# Patient Record
Sex: MALE | Race: WHITE | NOT HISPANIC OR LATINO | Employment: OTHER | URBAN - METROPOLITAN AREA
[De-identification: names, ages, dates, MRNs, and addresses within clinical notes are randomized per-mention and may not be internally consistent; named-entity substitution may affect disease eponyms.]

---

## 2018-11-06 ENCOUNTER — TELEPHONE (OUTPATIENT)
Dept: RADIOLOGY | Facility: HOSPITAL | Age: 68
End: 2018-11-06

## 2018-11-06 ENCOUNTER — HOSPITAL ENCOUNTER (OUTPATIENT)
Dept: RADIOLOGY | Facility: HOSPITAL | Age: 68
Discharge: HOME | End: 2018-11-06
Attending: INTERNAL MEDICINE
Payer: COMMERCIAL

## 2018-11-06 DIAGNOSIS — I10 ESSENTIAL HYPERTENSION, MALIGNANT: ICD-10-CM

## 2018-11-06 DIAGNOSIS — I10 ESSENTIAL HYPERTENSION, MALIGNANT: Primary | ICD-10-CM

## 2018-11-07 ENCOUNTER — HOSPITAL ENCOUNTER (OUTPATIENT)
Dept: RADIOLOGY | Facility: HOSPITAL | Age: 68
Discharge: HOME | End: 2018-11-07
Attending: INTERNAL MEDICINE
Payer: COMMERCIAL

## 2018-11-07 PROCEDURE — 93975 VASCULAR STUDY: CPT | Mod: 26,,, | Performed by: RADIOLOGY

## 2018-11-07 PROCEDURE — 93975 VASCULAR STUDY: CPT | Mod: TC,PO

## 2022-11-26 ENCOUNTER — HOSPITAL ENCOUNTER (INPATIENT)
Facility: HOSPITAL | Age: 72
LOS: 2 days | Discharge: NURSING, REHAB OR LONG-TERM CARE | DRG: 683 | End: 2022-11-29
Attending: EMERGENCY MEDICINE | Admitting: FAMILY MEDICINE
Payer: COMMERCIAL

## 2022-11-26 DIAGNOSIS — N19 UREMIA: Primary | ICD-10-CM

## 2022-11-26 DIAGNOSIS — E87.6 HYPOKALEMIA: ICD-10-CM

## 2022-11-26 DIAGNOSIS — N17.9 AKI (ACUTE KIDNEY INJURY): ICD-10-CM

## 2022-11-26 DIAGNOSIS — R53.1 GENERALIZED WEAKNESS: ICD-10-CM

## 2022-11-26 DIAGNOSIS — N39.0 ACUTE UTI (URINARY TRACT INFECTION): ICD-10-CM

## 2022-11-26 PROBLEM — G80.8 OTHER CEREBRAL PALSY: Status: ACTIVE | Noted: 2022-11-26

## 2022-11-26 PROBLEM — B37.89 CANDIDA RASH OF GROIN: Status: ACTIVE | Noted: 2022-11-26

## 2022-11-26 PROBLEM — R29.898 WEAKNESS OF BOTH LOWER EXTREMITIES: Status: ACTIVE | Noted: 2022-11-26

## 2022-11-26 PROBLEM — B00.50 HERPES SIMPLEX INFECTION OF EYE: Status: ACTIVE | Noted: 2022-11-26

## 2022-11-26 PROBLEM — I10 ESSENTIAL HYPERTENSION: Status: ACTIVE | Noted: 2022-11-26

## 2022-11-26 PROBLEM — K76.6 PORTAL HYPERTENSION: Status: ACTIVE | Noted: 2022-11-26

## 2022-11-26 PROBLEM — E78.2 MIXED HYPERLIPIDEMIA: Status: ACTIVE | Noted: 2022-11-26

## 2022-11-26 PROBLEM — N30.00 ACUTE CYSTITIS WITHOUT HEMATURIA: Status: ACTIVE | Noted: 2022-11-26

## 2022-11-26 LAB
ALBUMIN SERPL BCP-MCNC: 3.1 G/DL (ref 3.5–5.2)
ALP SERPL-CCNC: 77 U/L (ref 55–135)
ALT SERPL W/O P-5'-P-CCNC: 144 U/L (ref 10–44)
ANION GAP SERPL CALC-SCNC: 15 MMOL/L (ref 8–16)
AST SERPL-CCNC: 171 U/L (ref 10–40)
BACTERIA #/AREA URNS AUTO: ABNORMAL /HPF
BASOPHILS # BLD AUTO: 0.07 K/UL (ref 0–0.2)
BASOPHILS NFR BLD: 0.4 % (ref 0–1.9)
BILIRUB SERPL-MCNC: 0.9 MG/DL (ref 0.1–1)
BILIRUB UR QL STRIP: NEGATIVE
BUN SERPL-MCNC: 122 MG/DL (ref 8–23)
CALCIUM SERPL-MCNC: 8.4 MG/DL (ref 8.7–10.5)
CHLORIDE SERPL-SCNC: 104 MMOL/L (ref 95–110)
CLARITY UR REFRACT.AUTO: ABNORMAL
CO2 SERPL-SCNC: 23 MMOL/L (ref 23–29)
COLOR UR AUTO: YELLOW
CREAT SERPL-MCNC: 2.8 MG/DL (ref 0.5–1.4)
CTP QC/QA: YES
DIFFERENTIAL METHOD: ABNORMAL
EOSINOPHIL # BLD AUTO: 0 K/UL (ref 0–0.5)
EOSINOPHIL NFR BLD: 0.2 % (ref 0–8)
ERYTHROCYTE [DISTWIDTH] IN BLOOD BY AUTOMATED COUNT: 15.3 % (ref 11.5–14.5)
EST. GFR  (NO RACE VARIABLE): 23.7 ML/MIN/1.73 M^2
GLUCOSE SERPL-MCNC: 111 MG/DL (ref 70–110)
GLUCOSE UR QL STRIP: NEGATIVE
HCT VFR BLD AUTO: 36.2 % (ref 40–54)
HGB BLD-MCNC: 12.4 G/DL (ref 14–18)
HGB UR QL STRIP: ABNORMAL
HYALINE CASTS UR QL AUTO: 0 /LPF
IMM GRANULOCYTES # BLD AUTO: 0.25 K/UL (ref 0–0.04)
IMM GRANULOCYTES NFR BLD AUTO: 1.5 % (ref 0–0.5)
KETONES UR QL STRIP: NEGATIVE
LEUKOCYTE ESTERASE UR QL STRIP: ABNORMAL
LYMPHOCYTES # BLD AUTO: 2.9 K/UL (ref 1–4.8)
LYMPHOCYTES NFR BLD: 18.1 % (ref 18–48)
MCH RBC QN AUTO: 33.8 PG (ref 27–31)
MCHC RBC AUTO-ENTMCNC: 34.3 G/DL (ref 32–36)
MCV RBC AUTO: 99 FL (ref 82–98)
MICROSCOPIC COMMENT: ABNORMAL
MONOCYTES # BLD AUTO: 1.2 K/UL (ref 0.3–1)
MONOCYTES NFR BLD: 7.1 % (ref 4–15)
NEUTROPHILS # BLD AUTO: 11.7 K/UL (ref 1.8–7.7)
NEUTROPHILS NFR BLD: 72.7 % (ref 38–73)
NITRITE UR QL STRIP: NEGATIVE
NRBC BLD-RTO: 0 /100 WBC
PH UR STRIP: 8 [PH] (ref 5–8)
PLATELET # BLD AUTO: 228 K/UL (ref 150–450)
PMV BLD AUTO: 11.5 FL (ref 9.2–12.9)
POC MOLECULAR INFLUENZA A AGN: NEGATIVE
POC MOLECULAR INFLUENZA B AGN: NEGATIVE
POTASSIUM SERPL-SCNC: 3.4 MMOL/L (ref 3.5–5.1)
PROT SERPL-MCNC: 7 G/DL (ref 6–8.4)
PROT UR QL STRIP: ABNORMAL
RBC # BLD AUTO: 3.67 M/UL (ref 4.6–6.2)
RBC #/AREA URNS AUTO: 15 /HPF (ref 0–4)
SODIUM SERPL-SCNC: 142 MMOL/L (ref 136–145)
SP GR UR STRIP: 1.01 (ref 1–1.03)
URN SPEC COLLECT METH UR: ABNORMAL
UROBILINOGEN UR STRIP-ACNC: NEGATIVE EU/DL
WBC # BLD AUTO: 16.16 K/UL (ref 3.9–12.7)
WBC #/AREA URNS AUTO: 15 /HPF (ref 0–5)
WBC CLUMPS UR QL AUTO: ABNORMAL

## 2022-11-26 PROCEDURE — 63600175 PHARM REV CODE 636 W HCPCS: Mod: ER | Performed by: EMERGENCY MEDICINE

## 2022-11-26 PROCEDURE — 80053 COMPREHEN METABOLIC PANEL: CPT | Mod: ER | Performed by: EMERGENCY MEDICINE

## 2022-11-26 PROCEDURE — G0378 HOSPITAL OBSERVATION PER HR: HCPCS

## 2022-11-26 PROCEDURE — 63600175 PHARM REV CODE 636 W HCPCS: Performed by: NURSE PRACTITIONER

## 2022-11-26 PROCEDURE — 96372 THER/PROPH/DIAG INJ SC/IM: CPT | Performed by: NURSE PRACTITIONER

## 2022-11-26 PROCEDURE — 87086 URINE CULTURE/COLONY COUNT: CPT | Performed by: EMERGENCY MEDICINE

## 2022-11-26 PROCEDURE — 99291 CRITICAL CARE FIRST HOUR: CPT | Mod: 25,ER

## 2022-11-26 PROCEDURE — 86803 HEPATITIS C AB TEST: CPT | Performed by: EMERGENCY MEDICINE

## 2022-11-26 PROCEDURE — 96361 HYDRATE IV INFUSION ADD-ON: CPT | Mod: ER

## 2022-11-26 PROCEDURE — 87389 HIV-1 AG W/HIV-1&-2 AB AG IA: CPT | Performed by: EMERGENCY MEDICINE

## 2022-11-26 PROCEDURE — 25000003 PHARM REV CODE 250: Performed by: NURSE PRACTITIONER

## 2022-11-26 PROCEDURE — 85025 COMPLETE CBC W/AUTO DIFF WBC: CPT | Mod: ER | Performed by: EMERGENCY MEDICINE

## 2022-11-26 PROCEDURE — 87502 INFLUENZA DNA AMP PROBE: CPT | Mod: ER

## 2022-11-26 PROCEDURE — 96361 HYDRATE IV INFUSION ADD-ON: CPT

## 2022-11-26 PROCEDURE — 63700000 PHARM REV CODE 250 ALT 637 W/O HCPCS: Performed by: NURSE PRACTITIONER

## 2022-11-26 PROCEDURE — 81000 URINALYSIS NONAUTO W/SCOPE: CPT | Mod: ER | Performed by: EMERGENCY MEDICINE

## 2022-11-26 PROCEDURE — 96365 THER/PROPH/DIAG IV INF INIT: CPT | Mod: ER

## 2022-11-26 RX ORDER — ASPIRIN 81 MG/1
81 TABLET ORAL
COMMUNITY

## 2022-11-26 RX ORDER — TRIFLURIDINE 1 G/100ML
1 SOLUTION OPHTHALMIC 4 TIMES DAILY
COMMUNITY
Start: 2022-11-18

## 2022-11-26 RX ORDER — BENAZEPRIL HYDROCHLORIDE 40 MG/1
1.5 TABLET ORAL DAILY
COMMUNITY
Start: 2022-10-10

## 2022-11-26 RX ORDER — NEBIVOLOL 20 MG/1
1 TABLET ORAL DAILY
COMMUNITY
Start: 2022-09-22

## 2022-11-26 RX ORDER — VALACYCLOVIR HYDROCHLORIDE 500 MG/1
500 TABLET, FILM COATED ORAL 2 TIMES DAILY
COMMUNITY
Start: 2022-05-26

## 2022-11-26 RX ORDER — FENTANYL CITRATE 50 UG/ML
75 INJECTION, SOLUTION INTRAMUSCULAR; INTRAVENOUS
Status: DISCONTINUED | OUTPATIENT
Start: 2022-11-26 | End: 2022-11-26

## 2022-11-26 RX ORDER — DOXYLAMINE SUCCINATE 25 MG
TABLET ORAL 2 TIMES DAILY
Status: DISCONTINUED | OUTPATIENT
Start: 2022-11-26 | End: 2022-11-29 | Stop reason: HOSPADM

## 2022-11-26 RX ORDER — SODIUM CHLORIDE 9 MG/ML
INJECTION, SOLUTION INTRAVENOUS CONTINUOUS
Status: DISCONTINUED | OUTPATIENT
Start: 2022-11-26 | End: 2022-11-28

## 2022-11-26 RX ORDER — HYDROCHLOROTHIAZIDE 12.5 MG/1
12.5 CAPSULE ORAL
Status: ON HOLD | COMMUNITY
Start: 2022-09-30 | End: 2022-11-29 | Stop reason: HOSPADM

## 2022-11-26 RX ORDER — ENOXAPARIN SODIUM 100 MG/ML
40 INJECTION SUBCUTANEOUS EVERY 24 HOURS
Status: DISCONTINUED | OUTPATIENT
Start: 2022-11-26 | End: 2022-11-29 | Stop reason: HOSPADM

## 2022-11-26 RX ORDER — SIMETHICONE 80 MG
1 TABLET,CHEWABLE ORAL 4 TIMES DAILY PRN
Status: DISCONTINUED | OUTPATIENT
Start: 2022-11-26 | End: 2022-11-29 | Stop reason: HOSPADM

## 2022-11-26 RX ORDER — ASPIRIN 81 MG/1
81 TABLET ORAL DAILY
Status: DISCONTINUED | OUTPATIENT
Start: 2022-11-27 | End: 2022-11-29 | Stop reason: HOSPADM

## 2022-11-26 RX ORDER — ONDANSETRON 2 MG/ML
4 INJECTION INTRAMUSCULAR; INTRAVENOUS EVERY 8 HOURS PRN
Status: DISCONTINUED | OUTPATIENT
Start: 2022-11-26 | End: 2022-11-29 | Stop reason: HOSPADM

## 2022-11-26 RX ORDER — PROMETHAZINE HYDROCHLORIDE 25 MG/1
25 SUPPOSITORY RECTAL EVERY 6 HOURS PRN
Status: DISCONTINUED | OUTPATIENT
Start: 2022-11-26 | End: 2022-11-29 | Stop reason: HOSPADM

## 2022-11-26 RX ORDER — CIPROFLOXACIN 2 MG/ML
400 INJECTION, SOLUTION INTRAVENOUS EVERY 24 HOURS
Status: DISCONTINUED | OUTPATIENT
Start: 2022-11-27 | End: 2022-11-27

## 2022-11-26 RX ORDER — ACETAMINOPHEN 325 MG/1
650 TABLET ORAL EVERY 8 HOURS PRN
Status: DISCONTINUED | OUTPATIENT
Start: 2022-11-26 | End: 2022-11-29 | Stop reason: HOSPADM

## 2022-11-26 RX ORDER — VALACYCLOVIR HYDROCHLORIDE 500 MG/1
500 TABLET, FILM COATED ORAL 2 TIMES DAILY
Status: DISCONTINUED | OUTPATIENT
Start: 2022-11-26 | End: 2022-11-29 | Stop reason: HOSPADM

## 2022-11-26 RX ORDER — TRIFLURIDINE 1 G/100ML
1 SOLUTION OPHTHALMIC 4 TIMES DAILY
Status: DISCONTINUED | OUTPATIENT
Start: 2022-11-26 | End: 2022-11-29 | Stop reason: HOSPADM

## 2022-11-26 RX ORDER — ATORVASTATIN CALCIUM 10 MG/1
TABLET, FILM COATED ORAL
COMMUNITY
Start: 2022-02-21

## 2022-11-26 RX ORDER — AMLODIPINE BESYLATE 5 MG/1
1 TABLET ORAL DAILY
COMMUNITY
Start: 2022-07-05 | End: 2023-07-05

## 2022-11-26 RX ORDER — FLUCONAZOLE 100 MG/1
200 TABLET ORAL ONCE
Status: COMPLETED | OUTPATIENT
Start: 2022-11-26 | End: 2022-11-26

## 2022-11-26 RX ADMIN — VALACYCLOVIR HYDROCHLORIDE 500 MG: 500 TABLET, FILM COATED ORAL at 08:11

## 2022-11-26 RX ADMIN — SODIUM CHLORIDE, SODIUM LACTATE, POTASSIUM CHLORIDE, AND CALCIUM CHLORIDE 1000 ML: .6; .31; .03; .02 INJECTION, SOLUTION INTRAVENOUS at 10:11

## 2022-11-26 RX ADMIN — FLUCONAZOLE 200 MG: 100 TABLET ORAL at 05:11

## 2022-11-26 RX ADMIN — CEFTRIAXONE 1 G: 1 INJECTION, SOLUTION INTRAVENOUS at 12:11

## 2022-11-26 RX ADMIN — TRIFLURIDINE 1 DROP: 10 SOLUTION OPHTHALMIC at 08:11

## 2022-11-26 RX ADMIN — ENOXAPARIN SODIUM 40 MG: 40 INJECTION SUBCUTANEOUS at 05:11

## 2022-11-26 RX ADMIN — MICONAZOLE NITRATE: 20 CREAM TOPICAL at 08:11

## 2022-11-26 RX ADMIN — SODIUM CHLORIDE, SODIUM LACTATE, POTASSIUM CHLORIDE, AND CALCIUM CHLORIDE 1000 ML: .6; .31; .03; .02 INJECTION, SOLUTION INTRAVENOUS at 11:11

## 2022-11-26 RX ADMIN — TRIFLURIDINE 1 DROP: 10 SOLUTION OPHTHALMIC at 05:11

## 2022-11-26 RX ADMIN — SODIUM CHLORIDE: 0.9 INJECTION, SOLUTION INTRAVENOUS at 05:11

## 2022-11-26 NOTE — ASSESSMENT & PLAN NOTE
Patient said he had meningitis when he was born and developed cerebral palsy. Normally ambulates with a walker. Recently lost his mother and sister who helped care for him and he has been having difficulty caring for himself. Has several bruises and scabs. Will likely not be able to live alone and will need placement on discharge.  -PT/OT to eval and treat  -fall precautions

## 2022-11-26 NOTE — HPI
"72 y/o male with PMH of cerebral palsy s/p infantile meningitis, HTN, HLD, and herpes simplex of the eye presented to [facility] ER with complaints of generalized weakness and lightheadedness. He reports he usually ambulates with a walker, but the past few days has not been able to and has been "scooting himself" all over his house. He lives alone currently, was living with his mom and sister, but they both passed away the last 2 months. He has a neighbor that is a cousin that helps out when he calls them. He says he was having diarrhea the last week or so that stopped a few days ago, then he started having some weakness. His labs were WBC 16.16, K 3.4, Cr 2.8 and a UTI with hypotension on admission. He was given a dose of Rocephin IV x 1. He was transferred over to [facility] for higher level of care.   On exam, he was lying in bed, the room smells of urine, nurse had to clean him up when he arrived to floor and placed a male purewick to help keep him dry, has a candida rash to bilateral groins, and on left leg. He reports having difficulty taking care of himself since his mom and sister . He will likely need placement on discharge.  was consulted. Patient will be admitted under observation for evaluation of weakness and REGAN.   "

## 2022-11-26 NOTE — SUBJECTIVE & OBJECTIVE
Past Medical History:   Diagnosis Date    Cerebral palsy, unspecified     Hypertension        History reviewed. No pertinent surgical history.    Review of patient's allergies indicates:  No Known Allergies    No current facility-administered medications on file prior to encounter.     Current Outpatient Medications on File Prior to Encounter   Medication Sig    amLODIPine (NORVASC) 5 MG tablet Take 1 tablet by mouth once daily.    aspirin (ECOTRIN) 81 MG EC tablet Take 81 mg by mouth.    atorvastatin (LIPITOR) 10 MG tablet TAKE 1 TABLET BY MOUTH DAILY. INDICATIONS: HYPERCHOLESTEROLEMIA    benazepriL (LOTENSIN) 40 MG tablet Take 1.5 tablets by mouth once daily.    hydroCHLOROthiazide (MICROZIDE) 12.5 mg capsule Take 12.5 mg by mouth.    nebivoloL (BYSTOLIC) 20 mg Tab Take 1 tablet by mouth once daily.    trifluridine (VIROPTIC) 1 % ophthalmic solution Place 1 drop into both eyes 4 (four) times daily.    valACYclovir (VALTREX) 500 MG tablet Take 500 mg by mouth 2 (two) times daily.     Family History    None       Tobacco Use    Smoking status: Never    Smokeless tobacco: Never   Substance and Sexual Activity    Alcohol use: Never    Drug use: Never    Sexual activity: Not on file     Review of Systems   Constitutional:  Positive for fatigue. Negative for chills and fever.   Respiratory:  Negative for cough, shortness of breath and wheezing.    Gastrointestinal:  Negative for abdominal pain, constipation, diarrhea and nausea.   Genitourinary:  Positive for difficulty urinating and dysuria.   Skin:  Positive for rash.   Neurological:  Positive for weakness. Negative for dizziness.   Objective:     Vital Signs (Most Recent):  Temp: 98 °F (36.7 °C) (11/26/22 1607)  Pulse: 63 (11/26/22 1607)  Resp: 17 (11/26/22 1607)  BP: (!) 113/56 (11/26/22 1607)  SpO2: (!) 93 % (11/26/22 1607)   Vital Signs (24h Range):  Temp:  [98 °F (36.7 °C)-98.1 °F (36.7 °C)] 98 °F (36.7 °C)  Pulse:  [63-72] 63  Resp:  [16-18] 17  SpO2:  [93 %-100  %] 93 %  BP: ()/(48-58) 113/56     Weight: 93.2 kg (205 lb 7.5 oz)  Body mass index is 27.87 kg/m².    Physical Exam  Vitals and nursing note reviewed.   Constitutional:       Appearance: Normal appearance.   HENT:      Head: Normocephalic.      Mouth/Throat:      Mouth: Mucous membranes are dry.   Eyes:      Pupils: Pupils are equal, round, and reactive to light.   Cardiovascular:      Rate and Rhythm: Normal rate and regular rhythm.      Heart sounds: No murmur heard.  Pulmonary:      Effort: Pulmonary effort is normal.      Breath sounds: Normal breath sounds.   Abdominal:      General: Bowel sounds are normal.      Palpations: Abdomen is soft.   Musculoskeletal:      Comments: Strength 5/5 in all extremities   Skin:     General: Skin is warm and dry.      Comments: Candida rash in bilateral groins and on left leg, scattered bruises and scabs    Neurological:      General: No focal deficit present.      Mental Status: He is alert and oriented to person, place, and time.      Cranial Nerves: Facial asymmetry present.      Motor: Weakness and abnormal muscle tone present.      Gait: Gait abnormal.      Comments: Speech impairment   Psychiatric:         Mood and Affect: Mood normal.         Behavior: Behavior normal.         CRANIAL NERVES     CN III, IV, VI   Pupils are equal, round, and reactive to light.     Significant Labs: All pertinent labs within the past 24 hours have been reviewed.  BMP:   Recent Labs   Lab 11/26/22  1038   *      K 3.4*      CO2 23   *   CREATININE 2.8*   CALCIUM 8.4*     CBC:   Recent Labs   Lab 11/26/22  1038   WBC 16.16*   HGB 12.4*   HCT 36.2*          Significant Imaging: I have reviewed all pertinent imaging results/findings within the past 24 hours.

## 2022-11-26 NOTE — ASSESSMENT & PLAN NOTE
Patient with acute kidney injury likely due to IVVD/dehydration REGAN is currently stable. Labs reviewed- Renal function/electrolytes with Estimated Creatinine Clearance: 29.5 mL/min (A) (based on SCr of 2.8 mg/dL (H)). according to latest data.   -Monitor urine output and serial BMP and adjust therapy as needed. Avoid nephrotoxins and renally dose meds for GFR listed above.

## 2022-11-26 NOTE — ASSESSMENT & PLAN NOTE
-likely r/t acute infection and recent diarrhea causing dehydration  -order PT/OT  -consult , will likely need placement on discharge

## 2022-11-26 NOTE — H&P
"[facility]  History & Physical    Patient Name: Chevy Skaggs  MRN: [id number]  Patient Class: IP- Inpatient  Admission Date: 2022  Attending Physician: No att. providers found   Primary Care Provider: Ed Giron MD         Patient information was obtained from patient and ER records.     Subjective:     Principal Problem:REGAN (acute kidney injury)    Chief Complaint:   Chief Complaint   Patient presents with    Diarrhea     Diarrhea onset Monday, malaise         HPI: 72 y/o male with PMH of cerebral palsy s/p infantile meningitis, HTN, HLD, and herpes simplex of the eye presented to [facility] ER with complaints of generalized weakness and lightheadedness. He reports he usually ambulates with a walker, but the past few days has not been able to and has been "scooting himself" all over his house. He lives alone currently, was living with his mom and sister, but they both passed away the last 2 months. He has a neighbor that is a cousin that helps out when he calls them. He says he was having diarrhea the last week or so that stopped a few days ago, then he started having some weakness. His labs were WBC 16.16, K 3.4, Cr 2.8 and a UTI with hypotension on admission. He was given a dose of Rocephin IV x 1. He was transferred over to [facility] for higher level of care.   On exam, he was lying in bed, the room smells of urine, nurse had to clean him up when he arrived to floor and placed a male purewick to help keep him dry, has a candida rash to bilateral groins, and on left leg. He reports having difficulty taking care of himself since his mom and sister . He will likely need placement on discharge.  was consulted. Patient will be admitted under observation for evaluation of weakness and REGAN.       No new subjective & objective note has been filed under this hospital service since the last note was generated.    Assessment/Plan:     * REGAN (acute kidney " injury)  Patient with acute kidney injury likely due to IVVD/dehydration REGAN is currently stable. Labs reviewed- Renal function/electrolytes with Estimated Creatinine Clearance: 39.7 mL/min (A) (based on SCr of 2.1 mg/dL (H)). according to latest data.   -Monitor urine output and serial BMP and adjust therapy as needed. Avoid nephrotoxins and renally dose meds for GFR listed above.     11/27:  Cont IVF  Creatinine trending down     Acute cystitis without hematuria  -was given Rocephin IV x 1 dose at [facility] ER  -order Ciprofloxacin IV x 5 days  -UA showed +2 protein, WBC 15, bacteria, culture pending  -cont IVF hydration  -monitor strict I&Os    11/27:  abx changed to rocephin  Urine culture pending     Uremia  -see above    Candida rash of groin  -give Diflucan PO x 1 dose  -order miconazole topical BID to groin and leg  -cont male purewick to keep perineal dry    Weakness of both lower extremities  -likely r/t acute infection and recent diarrhea causing dehydration  -order PT/OT  -consult , will likely need placement on discharge    Other cerebral palsy  Patient said he had meningitis when he was born and developed cerebral palsy. Normally ambulates with a walker. Recently lost his mother and sister who helped care for him and he has been having difficulty caring for himself. Has several bruises and scabs. Will likely not be able to live alone and will need placement on discharge.  -PT/OT to eval and treat  -fall precautions    Herpes simplex infection of eye  -patient says he gets this often requiring treatment  -cont valacyclovir PO and trifluridine ophtha gtts  -follow up with PCP and ophthalmology on d/c    Essential hypertension  -Monitor VS routinely  -hypotensive on arrival d/t IVVD  -hold all home anti-hypertensives for now, restart when appropriate  -optimize pain control    Mixed hyperlipidemia  -hold statin for now      Generalized weakness  Will likely need placement  Pt/ot pending        Hypokalemia  Cont to replete         VTE Risk Mitigation (From admission, onward)           Ordered     IP VTE HIGH RISK PATIENT  Once         11/26/22 1708                       Rosalinda Anthony NP  Department of Hospital Medicine   [facility] - Telemetry ([facility])

## 2022-11-26 NOTE — ASSESSMENT & PLAN NOTE
-was given Rocephin IV x 1 dose at [facility] ER  -order Ciprofloxacin IV x 5 days  -UA showed +2 protein, WBC 15, bacteria, culture pending  -cont IVF hydration  -monitor strict I&Os

## 2022-11-26 NOTE — ASSESSMENT & PLAN NOTE
-Monitor VS routinely  -hypotensive on arrival d/t IVVD  -hold all home anti-hypertensives for now, restart when appropriate  -optimize pain control

## 2022-11-26 NOTE — Clinical Note
Diagnosis: Uremia [172403]   Future Attending Provider: TAMARA MORENO [[id number]]   Admitting Provider:: TAMARA MORENO [[id number]]   Special Needs:: No Special Needs [1]

## 2022-11-26 NOTE — ED PROVIDER NOTES
Encounter Date: 11/26/2022       History     Chief Complaint   Patient presents with    Diarrhea     Diarrhea onset Monday, malaise      HPI  Chevy Skaggs is a 71 y.o. male who presents with gradual onset, moderate to severe lightheadedness and fatigue with diarrhea, dysuria, and difficulty standing. He was feeling badly this AM so skipped his blood pressure medications.     Review of patient's allergies indicates:  No Known Allergies  Past Medical History:   Diagnosis Date    Cerebral palsy, unspecified     Hypertension      History reviewed. No pertinent surgical history.  History reviewed. No pertinent family history.  Social History     Tobacco Use    Smoking status: Never    Smokeless tobacco: Never   Substance Use Topics    Alcohol use: Never    Drug use: Never     Review of Systems   Constitutional:  Positive for fatigue.   HENT: Negative.     Eyes: Negative.    Respiratory: Negative.  Negative for cough and shortness of breath.    Cardiovascular: Negative.    Gastrointestinal: Negative.  Negative for abdominal pain.   Endocrine: Negative.    Genitourinary:  Positive for dysuria.   Musculoskeletal:  Positive for gait problem (chronic, s/t CP).   Skin: Negative.    Allergic/Immunologic: Negative.    Neurological:  Positive for light-headedness.   Hematological: Negative.    Psychiatric/Behavioral: Negative.     All other systems reviewed and are negative.      Physical Exam     Initial Vitals [11/26/22 1011]   BP Pulse Resp Temp SpO2   (!) 104/55 66 16 98.1 °F (36.7 °C) 96 %      MAP       --         Physical Exam    Nursing note and vitals reviewed.  Constitutional: He appears well-developed and well-nourished. No distress.   UTI odor noted in room   HENT:   Head: Normocephalic and atraumatic.   Mucous membranes dry     Eyes: Conjunctivae and EOM are normal. Pupils are equal, round, and reactive to light.   Neck: Neck supple.   Cardiovascular:  Normal rate, regular rhythm, normal heart sounds and intact  distal pulses.           Pulmonary/Chest: Breath sounds normal. No respiratory distress.   Abdominal: Abdomen is soft. He exhibits no distension. There is no abdominal tenderness.   Musculoskeletal:         General: No edema. Normal range of motion.      Cervical back: Neck supple.     Neurological: He is alert and oriented to person, place, and time.   Cerebral palsy with decreased BLE motor function, but ambulatory with assistance   Skin: Skin is warm and dry. Capillary refill takes less than 2 seconds.   Psychiatric: He has a normal mood and affect. His behavior is normal. Judgment and thought content normal.       ED Course   Critical Care    Date/Time: 11/26/2022 4:02 PM  Performed by: Leandro Osborn MD  Authorized by: Leandro Osborn MD   Direct patient critical care time: 15 minutes  Additional history critical care time: 6 minutes  Ordering / reviewing critical care time: 6 minutes  Documentation critical care time: 6 minutes  Consulting other physicians critical care time: 3 minutes  Total critical care time (exclusive of procedural time) : 36 minutes  Critical care time was exclusive of separately billable procedures and treating other patients and teaching time.  Critical care was necessary to treat or prevent imminent or life-threatening deterioration of the following conditions: renal failure and dehydration.  Critical care was time spent personally by me on the following activities: blood draw for specimens, development of treatment plan with patient or surrogate, discussions with consultants, interpretation of cardiac output measurements, evaluation of patient's response to treatment, examination of patient, obtaining history from patient or surrogate, ordering and review of laboratory studies, ordering and performing treatments and interventions, pulse oximetry, re-evaluation of patient's condition and review of old charts.      Labs Reviewed   URINALYSIS, REFLEX TO URINE CULTURE - Abnormal;  Notable for the following components:       Result Value    Appearance, UA Cloudy (*)     Protein, UA 2+ (*)     Occult Blood UA 3+ (*)     Leukocytes, UA 2+ (*)     All other components within normal limits    Narrative:     Specimen Source->Urine   CBC W/ AUTO DIFFERENTIAL - Abnormal; Notable for the following components:    WBC 16.16 (*)     RBC 3.67 (*)     Hemoglobin 12.4 (*)     Hematocrit 36.2 (*)     MCV 99 (*)     MCH 33.8 (*)     RDW 15.3 (*)     Immature Granulocytes 1.5 (*)     Gran # (ANC) 11.7 (*)     Immature Grans (Abs) 0.25 (*)     Mono # 1.2 (*)     All other components within normal limits    Narrative:     Release to patient->Immediate   COMPREHENSIVE METABOLIC PANEL - Abnormal; Notable for the following components:    Potassium 3.4 (*)     Glucose 111 (*)      (*)     Creatinine 2.8 (*)     Calcium 8.4 (*)     Albumin 3.1 (*)      (*)      (*)     eGFR 23.7 (*)     All other components within normal limits    Narrative:     Release to patient->Immediate   URINALYSIS MICROSCOPIC - Abnormal; Notable for the following components:    RBC, UA 15 (*)     WBC, UA 15 (*)     WBC Clumps, UA Few (*)     Bacteria Many (*)     All other components within normal limits    Narrative:     Specimen Source->Urine   CULTURE, URINE   HIV 1 / 2 ANTIBODY   HEPATITIS C ANTIBODY   HEP C VIRUS HOLD SPECIMEN   POCT INFLUENZA A/B MOLECULAR          Imaging Results    None          Medications   lactated ringers bolus 1,000 mL (0 mLs Intravenous Stopped 11/26/22 1105)   lactated ringers bolus 1,000 mL (0 mLs Intravenous Stopped 11/26/22 1536)   cefTRIAXone (ROCEPHIN) 1 g/50 mL D5W IVPB (0 g Intravenous Stopped 11/26/22 1247)                       Recent Results (from the past 24 hour(s))   CBC auto differential    Collection Time: 11/26/22 10:38 AM   Result Value Ref Range    WBC 16.16 (H) 3.90 - 12.70 K/uL    RBC 3.67 (L) 4.60 - 6.20 M/uL    Hemoglobin 12.4 (L) 14.0 - 18.0 g/dL    Hematocrit 36.2 (L)  40.0 - 54.0 %    MCV 99 (H) 82 - 98 fL    MCH 33.8 (H) 27.0 - 31.0 pg    MCHC 34.3 32.0 - 36.0 g/dL    RDW 15.3 (H) 11.5 - 14.5 %    Platelets 228 150 - 450 K/uL    MPV 11.5 9.2 - 12.9 fL    Immature Granulocytes 1.5 (H) 0.0 - 0.5 %    Gran # (ANC) 11.7 (H) 1.8 - 7.7 K/uL    Immature Grans (Abs) 0.25 (H) 0.00 - 0.04 K/uL    Lymph # 2.9 1.0 - 4.8 K/uL    Mono # 1.2 (H) 0.3 - 1.0 K/uL    Eos # 0.0 0.0 - 0.5 K/uL    Baso # 0.07 0.00 - 0.20 K/uL    nRBC 0 0 /100 WBC    Gran % 72.7 38.0 - 73.0 %    Lymph % 18.1 18.0 - 48.0 %    Mono % 7.1 4.0 - 15.0 %    Eosinophil % 0.2 0.0 - 8.0 %    Basophil % 0.4 0.0 - 1.9 %    Differential Method Automated    Comprehensive metabolic panel    Collection Time: 11/26/22 10:38 AM   Result Value Ref Range    Sodium 142 136 - 145 mmol/L    Potassium 3.4 (L) 3.5 - 5.1 mmol/L    Chloride 104 95 - 110 mmol/L    CO2 23 23 - 29 mmol/L    Glucose 111 (H) 70 - 110 mg/dL     (H) 8 - 23 mg/dL    Creatinine 2.8 (H) 0.5 - 1.4 mg/dL    Calcium 8.4 (L) 8.7 - 10.5 mg/dL    Total Protein 7.0 6.0 - 8.4 g/dL    Albumin 3.1 (L) 3.5 - 5.2 g/dL    Total Bilirubin 0.9 0.1 - 1.0 mg/dL    Alkaline Phosphatase 77 55 - 135 U/L     (H) 10 - 40 U/L     (H) 10 - 44 U/L    Anion Gap 15 8 - 16 mmol/L    eGFR 23.7 (A) >60 mL/min/1.73 m^2   POCT Influenza A/B Molecular    Collection Time: 11/26/22 11:23 AM   Result Value Ref Range    POC Molecular Influenza A Ag Negative Negative, Not Reported    POC Molecular Influenza B Ag Negative Negative, Not Reported     Acceptable Yes    Urinalysis, Reflex to Urine Culture Urine, Clean Catch    Collection Time: 11/26/22 11:24 AM    Specimen: Urine   Result Value Ref Range    Specimen UA Urine, Clean Catch     Color, UA Yellow Yellow, Straw, Mary    Appearance, UA Cloudy (A) Clear    pH, UA 8.0 5.0 - 8.0    Specific Gravity, UA 1.010 1.005 - 1.030    Protein, UA 2+ (A) Negative    Glucose, UA Negative Negative    Ketones, UA Negative Negative     Bilirubin (UA) Negative Negative    Occult Blood UA 3+ (A) Negative    Nitrite, UA Negative Negative    Urobilinogen, UA Negative <2.0 EU/dL    Leukocytes, UA 2+ (A) Negative   Urinalysis Microscopic    Collection Time: 11/26/22 11:24 AM   Result Value Ref Range    RBC, UA 15 (H) 0 - 4 /hpf    WBC, UA 15 (H) 0 - 5 /hpf    WBC Clumps, UA Few (A) None-Rare    Bacteria Many (A) None-Occ /hpf    Hyaline Casts, UA 0 0-1/lpf /lpf    Microscopic Comment SEE COMMENT        Medications   lactated ringers bolus 1,000 mL (0 mLs Intravenous Stopped 11/26/22 1105)   lactated ringers bolus 1,000 mL (0 mLs Intravenous Stopped 11/26/22 1536)   cefTRIAXone (ROCEPHIN) 1 g/50 mL D5W IVPB (0 g Intravenous Stopped 11/26/22 1247)       I discussed the patient's case with Rosalinda Anthony NP, who accepts the patient for admission.      Admitting MD:  Dr. Gutierrez  Admitting service:  Hospital Medicine   Admission type:  Obs med/surg           Clinical Impression:   Final diagnoses:  [N19] Uremia (Primary)  [N17.9] REGAN (acute kidney injury)  [N39.0] Acute UTI (urinary tract infection)        ED Disposition Condition    Observation Stable                Leandro Osborn MD  11/26/22 2869

## 2022-11-26 NOTE — PROGRESS NOTES
Pharmacist Renal Dose Adjustment Note    Chevy Skaggs is a 71 y.o. male being treated with the medication Ciprofloxacin    Patient Data:    Vital Signs (Most Recent):  Temp: 98 °F (36.7 °C) (11/26/22 1607)  Pulse: 63 (11/26/22 1607)  Resp: 17 (11/26/22 1607)  BP: (!) 113/56 (11/26/22 1607)  SpO2: (!) 93 % (11/26/22 1607)   Vital Signs (72h Range):  Temp:  [98 °F (36.7 °C)-98.1 °F (36.7 °C)]   Pulse:  [63-72]   Resp:  [16-18]   BP: ()/(48-58)   SpO2:  [93 %-100 %]      Recent Labs   Lab 11/26/22  1038   CREATININE 2.8*     Serum creatinine: 2.8 mg/dL (H) 11/26/22 1038  Estimated creatinine clearance: 29.5 mL/min (A)    Medication:Ciprofloxacin dose: 400mg frequency q12h will be changed to medication:Ciprofloxacin dose:400mg frequency:q24h    Pharmacist's Name: Mateo Durand  Pharmacist's Extension: [phone]

## 2022-11-26 NOTE — ASSESSMENT & PLAN NOTE
-patient says he gets this often requiring treatment  -cont valacyclovir PO and trifluridine ophtha gtts  -follow up with PCP and ophthalmology on d/c

## 2022-11-27 PROBLEM — R53.1 GENERALIZED WEAKNESS: Status: ACTIVE | Noted: 2022-11-27

## 2022-11-27 PROBLEM — E87.6 HYPOKALEMIA: Status: ACTIVE | Noted: 2022-11-27

## 2022-11-27 LAB
ANION GAP SERPL CALC-SCNC: 11 MMOL/L (ref 8–16)
BASOPHILS # BLD AUTO: 0.02 K/UL (ref 0–0.2)
BASOPHILS NFR BLD: 0.2 % (ref 0–1.9)
BUN SERPL-MCNC: 112 MG/DL (ref 8–23)
CALCIUM SERPL-MCNC: 7.2 MG/DL (ref 8.7–10.5)
CHLORIDE SERPL-SCNC: 107 MMOL/L (ref 95–110)
CO2 SERPL-SCNC: 22 MMOL/L (ref 23–29)
CREAT SERPL-MCNC: 2.1 MG/DL (ref 0.5–1.4)
DIFFERENTIAL METHOD: ABNORMAL
EOSINOPHIL # BLD AUTO: 0.1 K/UL (ref 0–0.5)
EOSINOPHIL NFR BLD: 0.8 % (ref 0–8)
ERYTHROCYTE [DISTWIDTH] IN BLOOD BY AUTOMATED COUNT: 15.4 % (ref 11.5–14.5)
EST. GFR  (NO RACE VARIABLE): 33 ML/MIN/1.73 M^2
GLUCOSE SERPL-MCNC: 132 MG/DL (ref 70–110)
HCT VFR BLD AUTO: 31.2 % (ref 40–54)
HCV AB SERPL QL IA: NEGATIVE
HEP C VIRUS HOLD SPECIMEN: NORMAL
HGB BLD-MCNC: 10.8 G/DL (ref 14–18)
HIV 1+2 AB+HIV1 P24 AG SERPL QL IA: NEGATIVE
IMM GRANULOCYTES # BLD AUTO: 0.18 K/UL (ref 0–0.04)
IMM GRANULOCYTES NFR BLD AUTO: 1.6 % (ref 0–0.5)
LACTATE SERPL-SCNC: 0.8 MMOL/L (ref 0.5–2.2)
LYMPHOCYTES # BLD AUTO: 3.3 K/UL (ref 1–4.8)
LYMPHOCYTES NFR BLD: 28.8 % (ref 18–48)
MAGNESIUM SERPL-MCNC: 3.1 MG/DL (ref 1.6–2.6)
MCH RBC QN AUTO: 33.9 PG (ref 27–31)
MCHC RBC AUTO-ENTMCNC: 34.6 G/DL (ref 32–36)
MCV RBC AUTO: 98 FL (ref 82–98)
MONOCYTES # BLD AUTO: 0.3 K/UL (ref 0.3–1)
MONOCYTES NFR BLD: 2.6 % (ref 4–15)
NEUTROPHILS # BLD AUTO: 7.6 K/UL (ref 1.8–7.7)
NEUTROPHILS NFR BLD: 66 % (ref 38–73)
NRBC BLD-RTO: 0 /100 WBC
PLATELET # BLD AUTO: 250 K/UL (ref 150–450)
PMV BLD AUTO: 11.1 FL (ref 9.2–12.9)
POTASSIUM SERPL-SCNC: 2.7 MMOL/L (ref 3.5–5.1)
PROCALCITONIN SERPL IA-MCNC: 5.37 NG/ML
RBC # BLD AUTO: 3.19 M/UL (ref 4.6–6.2)
SODIUM SERPL-SCNC: 140 MMOL/L (ref 136–145)
WBC # BLD AUTO: 11.5 K/UL (ref 3.9–12.7)

## 2022-11-27 PROCEDURE — 83605 ASSAY OF LACTIC ACID: CPT | Performed by: NURSE PRACTITIONER

## 2022-11-27 PROCEDURE — 96366 THER/PROPH/DIAG IV INF ADDON: CPT

## 2022-11-27 PROCEDURE — 36415 COLL VENOUS BLD VENIPUNCTURE: CPT | Performed by: NURSE PRACTITIONER

## 2022-11-27 PROCEDURE — 36415 COLL VENOUS BLD VENIPUNCTURE: CPT | Performed by: FAMILY MEDICINE

## 2022-11-27 PROCEDURE — 83735 ASSAY OF MAGNESIUM: CPT | Performed by: NURSE PRACTITIONER

## 2022-11-27 PROCEDURE — 85025 COMPLETE CBC W/AUTO DIFF WBC: CPT | Performed by: FAMILY MEDICINE

## 2022-11-27 PROCEDURE — 97530 THERAPEUTIC ACTIVITIES: CPT

## 2022-11-27 PROCEDURE — 93005 ELECTROCARDIOGRAM TRACING: CPT

## 2022-11-27 PROCEDURE — 84145 PROCALCITONIN (PCT): CPT | Performed by: NURSE PRACTITIONER

## 2022-11-27 PROCEDURE — 63600175 PHARM REV CODE 636 W HCPCS: Performed by: NURSE PRACTITIONER

## 2022-11-27 PROCEDURE — 63600175 PHARM REV CODE 636 W HCPCS: Performed by: FAMILY MEDICINE

## 2022-11-27 PROCEDURE — 93010 ELECTROCARDIOGRAM REPORT: CPT | Mod: ,,, | Performed by: INTERNAL MEDICINE

## 2022-11-27 PROCEDURE — 93010 EKG 12-LEAD: ICD-10-PCS | Mod: ,,, | Performed by: INTERNAL MEDICINE

## 2022-11-27 PROCEDURE — 25000003 PHARM REV CODE 250: Performed by: NURSE PRACTITIONER

## 2022-11-27 PROCEDURE — 21400001 HC TELEMETRY ROOM

## 2022-11-27 PROCEDURE — 97166 OT EVAL MOD COMPLEX 45 MIN: CPT

## 2022-11-27 PROCEDURE — 97162 PT EVAL MOD COMPLEX 30 MIN: CPT

## 2022-11-27 PROCEDURE — 80048 BASIC METABOLIC PNL TOTAL CA: CPT | Performed by: NURSE PRACTITIONER

## 2022-11-27 RX ORDER — POTASSIUM CHLORIDE 20 MEQ/1
40 TABLET, EXTENDED RELEASE ORAL 2 TIMES DAILY
Status: COMPLETED | OUTPATIENT
Start: 2022-11-27 | End: 2022-11-27

## 2022-11-27 RX ADMIN — ASPIRIN 81 MG: 81 TABLET, COATED ORAL at 08:11

## 2022-11-27 RX ADMIN — TRIFLURIDINE 1 DROP: 10 SOLUTION OPHTHALMIC at 09:11

## 2022-11-27 RX ADMIN — POTASSIUM CHLORIDE 40 MEQ: 1500 TABLET, EXTENDED RELEASE ORAL at 08:11

## 2022-11-27 RX ADMIN — CEFTRIAXONE 1 G: 1 INJECTION, SOLUTION INTRAVENOUS at 11:11

## 2022-11-27 RX ADMIN — SODIUM CHLORIDE: 0.9 INJECTION, SOLUTION INTRAVENOUS at 01:11

## 2022-11-27 RX ADMIN — TRIFLURIDINE 1 DROP: 10 SOLUTION OPHTHALMIC at 01:11

## 2022-11-27 RX ADMIN — MICONAZOLE NITRATE: 20 CREAM TOPICAL at 09:11

## 2022-11-27 RX ADMIN — ENOXAPARIN SODIUM 40 MG: 40 INJECTION SUBCUTANEOUS at 04:11

## 2022-11-27 RX ADMIN — VALACYCLOVIR HYDROCHLORIDE 500 MG: 500 TABLET, FILM COATED ORAL at 08:11

## 2022-11-27 RX ADMIN — MICONAZOLE NITRATE: 20 CREAM TOPICAL at 08:11

## 2022-11-27 RX ADMIN — POTASSIUM CHLORIDE 40 MEQ: 1500 TABLET, EXTENDED RELEASE ORAL at 09:11

## 2022-11-27 RX ADMIN — VALACYCLOVIR HYDROCHLORIDE 500 MG: 500 TABLET, FILM COATED ORAL at 09:11

## 2022-11-27 RX ADMIN — TRIFLURIDINE 1 DROP: 10 SOLUTION OPHTHALMIC at 04:11

## 2022-11-27 RX ADMIN — TRIFLURIDINE 1 DROP: 10 SOLUTION OPHTHALMIC at 08:11

## 2022-11-27 NOTE — PLAN OF CARE
Problem: Adult Inpatient Plan of Care  Goal: Optimal Comfort and Wellbeing  Outcome: Ongoing, Progressing     Problem: Adult Inpatient Plan of Care  Goal: Readiness for Transition of Care  Outcome: Ongoing, Progressing     Problem: Impaired Wound Healing  Goal: Optimal Wound Healing  Outcome: Ongoing, Progressing     Problem: Fluid and Electrolyte Imbalance (Acute Kidney Injury/Impairment)  Goal: Fluid and Electrolyte Balance  Outcome: Ongoing, Progressing

## 2022-11-27 NOTE — PT/OT/SLP EVAL
Occupational Therapy   Evaluation    Name: Chevy Skaggs  MRN: [id number]  Admitting Diagnosis:  REGAN (acute kidney injury)  Recent Surgery: * No surgery found *      Recommendations:     Discharge Recommendations: rehabilitation facility  Discharge Equipment Recommendations:  other (see comments) (TBD AT NEXT LEVEL OF CARE)  Barriers to discharge:  Decreased caregiver support    Assessment:     Chevy Skaggs is a 71 y.o. male with a medical diagnosis of REGAN (acute kidney injury).  He presents with generalized weakness. Performance deficits affecting function: weakness, impaired endurance, impaired self care skills, impaired functional mobility, impaired balance, gait instability.      Rehab Prognosis: Good; patient would benefit from acute skilled OT services to address these deficits and reach maximum level of function.       Plan:     Patient to be seen 2 x/week to address the above listed problems via self-care/home management, therapeutic activities, therapeutic exercises  Plan of Care Expires: 12/11/22  Plan of Care Reviewed with: patient    Subjective     Chief Complaint: Generalized weakness and debility   Patient/Family Comments/goals: To return to [facility]     Occupational Profile:  Living Environment: Pt lives in [facility] alone, no steps. Cousin who lives next door checks in with him.   Previous level of function: Independent with ADLs and Mod I FM   Roles and Routines: Pt did drive   Equipment Used at Home:  walker, rolling, cane, quad, wheelchair, bedside commode, grab bar, bath bench  Assistance upon Discharge: Unknown    Pain/Comfort:  Pain Rating 1: 0/10    Patients cultural, spiritual, Bahai conflicts given the current situation: no    Objective:     Communicated with: Nurse Puente and epic chart review  prior to session.  Patient found supine with peripheral IV, bed alarm upon OT entry to room.    General Precautions: Standard,     Orthopedic Precautions:N/A   Braces: N/A  Respiratory Status: Room  air    Occupational Performance:    Bed Mobility:    Patient completed Rolling/Turning to Right with minimum assistance  Patient completed Scooting/Bridging with minimum assistance  Patient completed Supine to Sit with minimum assistance    Functional Mobility/Transfers:  Patient completed Sit <> Stand Transfer with moderate assistance  with  rolling walker   Patient completed Bed <> Chair Transfer using Step Transfer technique with moderate assistance with rolling walker  Functional Mobility: Pt Min A for small steps from bed to chair with RW and max cues for sequence       Cognitive/Visual Perceptual:  Cognitive/Psychosocial Skills:     -       Oriented to: Person, Place, Time, and Situation   -       Follows Commands/attention:Follows multistep  commands  -       Communication: clear/fluent  -       Safety awareness/insight to disability: intact     Physical Exam:  Upper Extremity Range of Motion:     -       Right Upper Extremity: WFL  -       Left Upper Extremity: WFL  Upper Extremity Strength:    -       Right Upper Extremity: WFL  -       Left Upper Extremity: WFL   Strength:    -       Right Upper Extremity: WFL  -       Left Upper Extremity: WFL    AMPAC 6 Click ADL:  AMPAC Total Score: 16    Treatment & Education:  OT eval and tx completed per MD order. Based on PLOF and performance this date, acute OT services are recommended. Pt educated on purpose of occupational therapy and benefits of active participation. Pt educated on fall prevention and use of call button. Pt educated on benefits of OOB ax and completion of therex to improve endurance and muscle strength. Pt verbalized understanding of all education.       Patient left up in chair with all lines intact, call button in reach, and chair alarm on    GOALS:   Multidisciplinary Problems       Occupational Therapy Goals          Problem: Occupational Therapy    Goal Priority Disciplines Outcome Interventions   Occupational Therapy Goal     OT, PT/OT  Ongoing, Progressing    Description: To improve safety and increase independence during ADLs and functional tasks:    Pt will complete functional transfers Min A   Pt will complete LE dressing Min A   Pt will complete toileting Min A  Pt will complete B UE therex 1x10 per handout                        History:     Past Medical History:   Diagnosis Date    Cerebral palsy, unspecified     Hypertension        History reviewed. No pertinent surgical history.    Time Tracking:     OT Date of Treatment: 11/27/22  OT Start Time: 0915  OT Stop Time: 0940  OT Total Time (min): 25 min    Billable Minutes:Evaluation 15 Mins   Therapeutic Activity 10 Mins    11/27/2022

## 2022-11-27 NOTE — PLAN OF CARE
Nutrition Plan of Care:    Recommendations     Recommendation/Intervention:   1.  Encourage po intake   2. Recommend ivette BID to promote wound healing   3. Collaboration with medical providers     Goals: Patient to consume >50% of EEN prior to RD follow up.  Nutrition Goal Status: new  Communication of RD Recs: other (comment)     Assessment and Plan     Nutrition Problem  Increased nutritional needs      Related to (etiology):   Need for higher calories to promote wound healing      Signs and Symptoms (as evidenced by):   Coccyx wound      Interventions/Recommendations (treatment strategy):  1.  Encourage po intake   2. Recommend ivette BID to promote wound healing   3. Collaboration with medical providers     Nutrition Diagnosis Status:   Ant Cooper MS, RDN, LDN

## 2022-11-27 NOTE — PLAN OF CARE
Pt AAOx4. No signs of distress.   Able to verbalize needs and follow commands. Calm and Cooperative.   Denies any pain at this time.   Vital signs stable.  On room air   Encouraged pt  to turn frequently. Resting quietly in bed. Bed low. Side rails up x2, wheels locked, call light within reach.

## 2022-11-27 NOTE — ASSESSMENT & PLAN NOTE
Patient with acute kidney injury likely due to IVVD/dehydration REGAN is currently stable. Labs reviewed- Renal function/electrolytes with Estimated Creatinine Clearance: 39.7 mL/min (A) (based on SCr of 2.1 mg/dL (H)). according to latest data.   -Monitor urine output and serial BMP and adjust therapy as needed. Avoid nephrotoxins and renally dose meds for GFR listed above.     11/27:  Cont IVF  Creatinine trending down

## 2022-11-27 NOTE — PROGRESS NOTES
"[facility] ([facility] Medicine  Progress Note    Patient Name: Chevy Skaggs  MRN: [id number]  Patient Class: OP- Observation   Admission Date: 2022  Length of Stay: 0 days  Attending Physician: Aditya Gutierrez MD  Primary Care Provider: Ed Giron MD        Subjective:     Principal Problem:REGAN (acute kidney injury)        HPI:  72 y/o male with PMH of cerebral palsy s/p infantile meningitis, HTN, HLD, and herpes simplex of the eye presented to [facility] ER with complaints of generalized weakness and lightheadedness. He reports he usually ambulates with a walker, but the past few days has not been able to and has been "scooting himself" all over his house. He lives alone currently, was living with his mom and sister, but they both passed away the last 2 months. He has a neighbor that is a cousin that helps out when he calls them. He says he was having diarrhea the last week or so that stopped a few days ago, then he started having some weakness. His labs were WBC 16.16, K 3.4, Cr 2.8 and a UTI with hypotension on admission. He was given a dose of Rocephin IV x 1. He was transferred over to [facility] for higher level of care.   On exam, he was lying in bed, the room smells of urine, nurse had to clean him up when he arrived to floor and placed a male purewick to help keep him dry, has a candida rash to bilateral groins, and on left leg. He reports having difficulty taking care of himself since his mom and sister . He will likely need placement on discharge.  was consulted.       Overview/Hospital Course:  : pt still with hypokalemia, will need further replacement. Pt/ot on case. Likely needs snf. Cont rocephin for UTI. Procal and leukocytosis noted. Will check chest xray. Add azithromycin if pna noted.       Interval History: Patient denies any issues this am. States he lives at home alone.     Review of Systems   Constitutional:  Positive for fatigue. Negative for fever. "   HENT:  Negative for sinus pressure.    Eyes:  Negative for visual disturbance.   Respiratory:  Negative for shortness of breath.    Cardiovascular:  Negative for chest pain.   Gastrointestinal:  Negative for nausea and vomiting.   Genitourinary:  Negative for difficulty urinating.   Musculoskeletal:  Negative for back pain.   Skin:  Negative for rash.   Neurological:  Positive for weakness. Negative for headaches.   Psychiatric/Behavioral:  Negative for confusion.    Objective:     Vital Signs (Most Recent):  Temp: 97.7 °F (36.5 °C) (11/27/22 0725)  Pulse: 61 (11/27/22 0725)  Resp: 16 (11/27/22 0725)  BP: (!) 105/53 (11/27/22 0725)  SpO2: (!) 94 % (11/27/22 0725)   Vital Signs (24h Range):  Temp:  [97.5 °F (36.4 °C)-98.2 °F (36.8 °C)] 97.7 °F (36.5 °C)  Pulse:  [60-72] 61  Resp:  [16-19] 16  SpO2:  [93 %-100 %] 94 %  BP: ()/(48-58) 105/53     Weight: 94.9 kg (209 lb 3.5 oz)  Body mass index is 28.37 kg/m².    Intake/Output Summary (Last 24 hours) at 11/27/2022 0957  Last data filed at 11/26/2022 1247  Gross per 24 hour   Intake 1100 ml   Output --   Net 1100 ml      Physical Exam  Constitutional:       General: He is not in acute distress.     Appearance: He is well-developed. He is not diaphoretic.      Comments: Disheveled    HENT:      Head: Normocephalic and atraumatic.   Eyes:      Pupils: Pupils are equal, round, and reactive to light.   Cardiovascular:      Rate and Rhythm: Normal rate and regular rhythm.      Heart sounds: Normal heart sounds. No murmur heard.    No friction rub. No gallop.   Pulmonary:      Effort: Pulmonary effort is normal. No respiratory distress.      Breath sounds: Normal breath sounds. No stridor. No wheezing or rales.   Abdominal:      General: Bowel sounds are normal. There is no distension.      Palpations: Abdomen is soft. There is no mass.      Tenderness: There is no abdominal tenderness. There is no guarding.   Skin:     General: Skin is warm.      Findings: No  erythema.   Neurological:      General: No focal deficit present.      Mental Status: He is alert and oriented to person, place, and time.       Significant Labs: All pertinent labs within the past 24 hours have been reviewed.    Significant Imaging: I have reviewed all pertinent imaging results/findings within the past 24 hours.        Assessment/Plan:      * REGAN (acute kidney injury)  Patient with acute kidney injury likely due to IVVD/dehydration REGAN is currently stable. Labs reviewed- Renal function/electrolytes with Estimated Creatinine Clearance: 39.7 mL/min (A) (based on SCr of 2.1 mg/dL (H)). according to latest data.   -Monitor urine output and serial BMP and adjust therapy as needed. Avoid nephrotoxins and renally dose meds for GFR listed above.     11/27:  Cont IVF  Creatinine trending down     Generalized weakness  Will likely need placement  Pt/ot pending       Hypokalemia  Cont to replete       Uremia  -see above    Candida rash of groin  -give Diflucan PO x 1 dose  -order miconazole topical BID to groin and leg  -cont male purewick to keep perineal dry    Weakness of both lower extremities  -likely r/t acute infection and recent diarrhea causing dehydration  -order PT/OT  -consult , will likely need placement on discharge    Herpes simplex infection of eye  -patient says he gets this often requiring treatment  -cont valacyclovir PO and trifluridine ophtha gtts  -follow up with PCP and ophthalmology on d/c    Mixed hyperlipidemia  -hold statin for now      Essential hypertension  -Monitor VS routinely  -hypotensive on arrival d/t IVVD  -hold all home anti-hypertensives for now, restart when appropriate  -optimize pain control    Other cerebral palsy  Patient said he had meningitis when he was born and developed cerebral palsy. Normally ambulates with a walker. Recently lost his mother and sister who helped care for him and he has been having difficulty caring for himself. Has several  bruises and scabs. Will likely not be able to live alone and will need placement on discharge.  -PT/OT to eval and treat  -fall precautions    Acute cystitis without hematuria  -was given Rocephin IV x 1 dose at [facility] ER  -order Ciprofloxacin IV x 5 days  -UA showed +2 protein, WBC 15, bacteria, culture pending  -cont IVF hydration  -monitor strict I&Os    11/27:  abx changed to rocephin  Urine culture pending       VTE Risk Mitigation (From admission, onward)         Ordered     enoxaparin injection 40 mg  Daily         11/26/22 1705     IP VTE HIGH RISK PATIENT  Once         11/26/22 1705     Place sequential compression device  Until discontinued         11/26/22 1705                Discharge Planning   DON:      Code Status: DNR   Is the patient medically ready for discharge?:     Reason for patient still in hospital (select all that apply): Patient trending condition                     Aditay Gutierrez MD  Department of Hospital Medicine   [facility] - Tele[facility] ([facility])

## 2022-11-27 NOTE — PLAN OF CARE
P.T. EVAL COMPLETE.  PT CURRENTLY REQUIRES SBA FOR BED MOBILITY, MODA FOR TF'S USING RW.  P.T. RECOMMENDS INPATIENT REHAB AT D/C

## 2022-11-27 NOTE — PLAN OF CARE
POC reviewed with pt. Pt verbalizes understanding of POC. No questions at this time.  AAOx4 NADN.  Pt remains free of falls. Patient can turn and reposition independently.   NS infusing at 100mL  Male purewick in place.   No complaints at this time.  Safety measures in place. Will continue to monitor.  Informed pt to call for assistance before getting up. Pt verbalizes understanding.   Hourly rounding complete

## 2022-11-27 NOTE — CONSULTS
[facility] Telemetry ([facility])  Adult Nutrition  Consult Note    SUMMARY     Recommendations    Recommendation/Intervention:   1.  Encourage po intake   2. Recommend ivette BID to promote wound healing   3. Collaboration with medical providers    Goals: Patient to consume >50% of EEN prior to RD follow up.  Nutrition Goal Status: new  Communication of RD Recs: other (comment)    Assessment and Plan    Nutrition Problem  Increased nutritional needs     Related to (etiology):   Need for higher calories to promote wound healing     Signs and Symptoms (as evidenced by):   Coccyx wound     Interventions/Recommendations (treatment strategy):  1.  Encourage po intake   2. Recommend ivette BID to promote wound healing   3. Collaboration with medical providers    Nutrition Diagnosis Status:   New      Malnutrition Assessment                                       Reason for Assessment    Reason For Assessment: consult, other (see comments) (Wounds)  Diagnosis: renal disease, other (see comments) (cerebral palsy)  Patient Active Problem List   Diagnosis    Acute cystitis without hematuria    REGAN (acute kidney injury)    Other cerebral palsy    Essential hypertension    Mixed hyperlipidemia    Herpes simplex infection of eye    Weakness of both lower extremities    Candida rash of groin    Uremia    Hypokalemia    Generalized weakness     Past Medical History:   Diagnosis Date    Cerebral palsy, unspecified     Hypertension        Interdisciplinary Rounds: did not attend (RD Remote)    General Information Comments:   11/27: Patient with a regular diet.  PO intake unavailable in documentation.  Patient unable to be reached via phone. Notes stated tolerance issues of diarrhea prior to admit. Labs reviewed.  NKFA.  RD consulted for wound healing.  Patient with wound noted to coccyx area.  RD recommends ivette BID to promote wound healing.  RD to continue to monitor po intake and tolerance.  (RD remote)    Nutrition Discharge  Planning: Patient to consume adequate oral intake prior and post discharge.    Nutrition Risk Screen    Nutrition Risk Screen: large or nonhealing wound, burn or pressure injury    Nutrition/Diet History    Spiritual, Cultural Beliefs, Restorationism Practices, Values that Affect Care: no  Food Allergies: NKFA  Factors Affecting Nutritional Intake: diarrhea    Anthropometrics    Temp: 98 °F (36.7 °C)  Height Method: Stated  Height: 6' (182.9 cm)  Height (inches): 72 in  Weight Method: Bed Scale  Weight: 94.9 kg (209 lb 3.5 oz)  Weight (lb): 209.22 lb  Ideal Body Weight (IBW), Male: 178 lb  % Ideal Body Weight, Male (lb): 117.54 %  BMI (Calculated): 28.4  BMI Grade: 25 - 29.9 - overweight       Lab/Procedures/Meds    Pertinent Labs Reviewed: reviewed  Pertinent Medications Reviewed: reviewed  BMP  Lab Results   Component Value Date     11/27/2022    K 2.7 (LL) 11/27/2022     11/27/2022    CO2 22 (L) 11/27/2022     (H) 11/27/2022    CREATININE 2.1 (H) 11/27/2022    CALCIUM 7.2 (L) 11/27/2022    ANIONGAP 11 11/27/2022    EGFRNORACEVR 33 (A) 11/27/2022     Lab Results   Component Value Date    HGBA1C 6.0 (H) 10/10/2022     Lab Results   Component Value Date    ALBUMIN 3.1 (L) 11/26/2022     Scheduled Meds:   aspirin  81 mg Oral Daily    cefTRIAXone (ROCEPHIN) IVPB  1 g Intravenous Q24H    enoxaparin  40 mg Subcutaneous Daily    miconazole   Topical (Top) BID    potassium chloride  40 mEq Oral BID    trifluridine  1 drop Both Eyes QID    valACYclovir  500 mg Oral BID     Continuous Infusions:   sodium chloride 0.9% 100 mL/hr at 11/27/22 1409     PRN Meds:.acetaminophen, dextrose 10%, dextrose 10%, ondansetron, promethazine, simethicone    Physical Findings/Assessment         Estimated/Assessed Needs    Weight Used For Calorie Calculations: 94.9 kg (209 lb 3.5 oz)  Energy Calorie Requirements (kcal): 20-25kcals/kg (1898-2373kcals/day)  Energy Need Method: Kcal/kg  Protein Requirements: 0.8-1.2g/kg  Weight  Used For Protein Calculations: 94.9 kg (209 lb 3.5 oz)     Estimated Fluid Requirement Method: RDA Method  RDA Method (mL): 20  CHO Requirement: 250      Nutrition Prescription Ordered    Current Diet Order: Regular    Evaluation of Received Nutrient/Fluid Intake  Last Bowel Movement: 11/26/22    % Kcal Needs: MAGNUS  % Protein Needs: MAGNUS  Energy Calories Required: other (see comments)  Protein Required: other (see comments)  Fluid Required: other (see comments)  Tolerance: tolerating  % Intake of Estimated Energy Needs: Other: other  % Meal Intake: Other: other    Nutrition Risk    Level of Risk/Frequency of Follow-up: moderate       Monitor and Evaluation    Food and Nutrient Intake: food and beverage intake, energy intake  Food and Nutrient Adminstration: diet order  Knowledge/Beliefs/Attitudes: food and nutrition knowledge/skill, beliefs and attitudes  Physical Activity and Function: factors affecting access to physical activity, nutrition-related ADLs and IADLs  Anthropometric Measurements: height/length, weight, weight change, body mass index  Biochemical Data, Medical Tests and Procedures: electrolyte and renal panel, lipid profile, gastrointestinal profile, glucose/endocrine profile, inflammatory profile       Nutrition Follow-Up    RD Follow-up?: Yes  Jamee Cooper, MS, RDN, LDN

## 2022-11-27 NOTE — CONSULTS
Sw spoke pt family. SW explained role. Pt family would like a rehab referral sent to [facility] and [facility] facilities in [place]. Pt family resides in [place] and would like him close. SW sent referral via [facility].

## 2022-11-27 NOTE — HOSPITAL COURSE
11/27: pt still with hypokalemia, will need further replacement. Pt/ot on case. Likely needs snf. Cont rocephin for UTI. Procal and leukocytosis noted. Will check chest xray. Add azithromycin if pna noted.   11/28:  Patient alert and oriented x3, potassium improving, ordered replacement, given hypernatremia-ordered IV fluids, PT OT evaluated and recommended rehab.  Follow up with , pending authorization.  Antibiotics for UTI, creatinine trending down;   11/29:  Patient alert and oriented x3, denied acute issues, complaining of chronic back pain. Afebrile;  Sodium trended down, creatinine trended down to 1.1, given findings of UTI, strongly recommended to continue antibiotics upon discharge to rehab.  Recommended compliance with physical therapy, occupation therapy.    At rehab, recommend follow up on labs, continue PT OT, wound care, maintain hydration, q2 hourly turns in the bed; considering clinical and hemodynamic stability planning to discharge patient to rehab today,  working on arrangements.

## 2022-11-27 NOTE — PT/OT/SLP EVAL
Physical Therapy Evaluation    Patient Name:  Chevy Skaggs   MRN:  [id number]    Recommendations:     Discharge Recommendations:  rehabilitation facility   Discharge Equipment Recommendations:  (TBD AT NEXT LEVEL OF CARE)   Barriers to discharge: Decreased caregiver support    Assessment:     Chevy Skaggs is a 71 y.o. male admitted with a medical diagnosis of REGAN (acute kidney injury).  He presents with the following impairments/functional limitations:  weakness, impaired endurance, impaired skin, edema, impaired functional mobility, gait instability, impaired balance, decreased safety awareness, decreased lower extremity function, decreased coordination.    Rehab Prognosis: Good; patient would benefit from acute skilled PT services to address these deficits and reach maximum level of function.    Recent Surgery: * No surgery found *     Plan:     During this hospitalization, patient to be seen 3 x/week to address the identified rehab impairments via gait training, therapeutic activities, therapeutic exercises and progress toward the following goals:    Plan of Care Expires:  12/11/22    Subjective     Chief Complaint:   Patient/Family Comments/goals:   Pain/Comfort:  Pain Rating 1: 0/10    Patients cultural, spiritual, [employer] conflicts given the current situation:      Living Environment:  PT LIVES ALONE, NO CAREGIVER SUPPORT SINCE MOM AND SISTER RECENTLY PASSED PER CHART, PT REPORTS SHE HAS AN ELDERLY COUSIN THAT LIVES NEXT DOOR BUT UNABLE TO CARE FOR HIM, PT USUALLY AMB WITH RW COMMUNITY DISTANCES, DRIVES, INDEP WITH ADL'S BUT FOR LAST FEW DAYS HE HAS BEEN TOO WEAK TO WALK, FEARFUL OF FALLING SO REPORTS CRAWLING AROUND HOME  Prior to admission, patients level of function was .  Equipment used at home: walker, rolling, cane, quad, wheelchair, bedside commode, grab bar, bath bench.  DME owned (not currently used): none.  Upon discharge, patient will have assistance from ?.    Objective:     Communicated with  "NURSE RAY prior to session.  Patient found supine with telemetry, peripheral IV, bed alarm  upon PT entry to room.    General Precautions: Standard, fall (H/O CEREBRAL PALSY)   Orthopedic Precautions:N/A   Braces: N/A  Respiratory Status: Room air    Exams:  Cognitive Exam:  Patient is oriented to Person, Place, Time, and Situation  Postural Exam:  Patient presented with the following abnormalities:    -       Rounded shoulders  Sensation:    -       Intact  Skin Integrity/Edema:      -       Skin integrity: Bruising of BLE  RLE ROM: MILDLY LIMITED BUT FUNCTIONAL  RLE Strength: GROSSLY 3/5  LLE ROM: MILDLY LIMITED BUT FUNCTIONAL   LLE Strength: GROSSLY 3/5    Functional Mobility:  Bed Mobility:     Rolling Left:  minimum assistance  Scooting: minimum assistance  Supine to Sit: minimum assistance, CUES FOR CORRECT TECHNIQUE, PT SLOW MOVING BUT GOOD EFFORT  Transfers:     Sit to Stand:  moderate assistance and BED ELEVATED with rolling walker, REQUIRED VERBAL AND TACTILE CUES FOR ANT. WT. SHIFT FWD DUE TO POSTERIOR INSTABILITY  Bed to Chair: moderate assistance with  rolling walker  using  Step Transfer  Gait: PT TOOK APPROX. 4 SMALL SHUFFLING STEPS WITH RW AND MODA TO TF FROM BED TO CHAIR, CUES FOR UPRIGHT POSTURE AND RW SAFETY, QUICK TO FATIGUE, PT WITH BENT KNEE'S AT BASELINE  Balance: POOR    AM-PAC 6 CLICK MOBILITY  Total Score:13     Treatment & Education:  PT EDUCATED IN ROLE OF P.T. AND POC IN ACUTE CARE HOSPITAL SETTING, EDUCATED IN RW USE AND SAFETY DURING TF'S AND GAIT, ENCOURAGED TO INCREASE TIME OOB IN CHAIR TO TOLERANCE, EDUCATED IN AND ENCOURAGED TO PERFORM BLE THEREX WHILE SEATED OR SUPINE THROUGHOUT THE DAY TO TOLERANCE: HIP FLEX/EXT, QUAD SET, LAQ, HEEL SLIDES, AP'S.  PT AGREEABLE TO REQUESTS  PT EDUCATED ON RISK FOR FALLS DUE TO GENERALIZED WEAKNESS, EDUCATED ON "CALL DON'T FALL", ENCOURAGED TO CALL FOR ASSISTANCE WITH ALL NEEDS SUCH AS BED<>CHAIR TRANSFERS OR TRIPS TO BATHROOM, PT AGREEABLE TO " SAFETY PRECAUTIONS    Patient left up in chair with all lines intact, call button in reach, chair alarm on, and NURSE notified.    GOALS:   Multidisciplinary Problems       Physical Therapy Goals          Problem: Physical Therapy    Goal Priority Disciplines Outcome Goal Variances Interventions   Physical Therapy Goal     PT, PT/OT      Description: LTG'S TO BE MET IN 14 DAYS (12-11-22)  PT WILL REQUIRE CGA FOR BED MOBILITY  PT WILL REQUIRE CRYSTAL FOR BED<>CHAIR TF'S  PT WILL AMB 50 FEET WITH RW AND CRYSTAL                           History:     Past Medical History:   Diagnosis Date    Cerebral palsy, unspecified     Hypertension        History reviewed. No pertinent surgical history.    Time Tracking:     PT Received On: 11/27/22  PT Start Time: 0855     PT Stop Time: 0920  PT Total Time (min): 25 min     Billable Minutes: Evaluation 15 and Therapeutic Activity 10    11/27/2022

## 2022-11-27 NOTE — SUBJECTIVE & OBJECTIVE
Interval History: Patient denies any issues this am. States he lives at home alone.     Review of Systems   Constitutional:  Positive for fatigue. Negative for fever.   HENT:  Negative for sinus pressure.    Eyes:  Negative for visual disturbance.   Respiratory:  Negative for shortness of breath.    Cardiovascular:  Negative for chest pain.   Gastrointestinal:  Negative for nausea and vomiting.   Genitourinary:  Negative for difficulty urinating.   Musculoskeletal:  Negative for back pain.   Skin:  Negative for rash.   Neurological:  Positive for weakness. Negative for headaches.   Psychiatric/Behavioral:  Negative for confusion.    Objective:     Vital Signs (Most Recent):  Temp: 97.7 °F (36.5 °C) (11/27/22 0725)  Pulse: 61 (11/27/22 0725)  Resp: 16 (11/27/22 0725)  BP: (!) 105/53 (11/27/22 0725)  SpO2: (!) 94 % (11/27/22 0725)   Vital Signs (24h Range):  Temp:  [97.5 °F (36.4 °C)-98.2 °F (36.8 °C)] 97.7 °F (36.5 °C)  Pulse:  [60-72] 61  Resp:  [16-19] 16  SpO2:  [93 %-100 %] 94 %  BP: ()/(48-58) 105/53     Weight: 94.9 kg (209 lb 3.5 oz)  Body mass index is 28.37 kg/m².    Intake/Output Summary (Last 24 hours) at 11/27/2022 0957  Last data filed at 11/26/2022 1247  Gross per 24 hour   Intake 1100 ml   Output --   Net 1100 ml      Physical Exam  Constitutional:       General: He is not in acute distress.     Appearance: He is well-developed. He is not diaphoretic.      Comments: Disheveled    HENT:      Head: Normocephalic and atraumatic.   Eyes:      Pupils: Pupils are equal, round, and reactive to light.   Cardiovascular:      Rate and Rhythm: Normal rate and regular rhythm.      Heart sounds: Normal heart sounds. No murmur heard.    No friction rub. No gallop.   Pulmonary:      Effort: Pulmonary effort is normal. No respiratory distress.      Breath sounds: Normal breath sounds. No stridor. No wheezing or rales.   Abdominal:      General: Bowel sounds are normal. There is no distension.      Palpations:  Abdomen is soft. There is no mass.      Tenderness: There is no abdominal tenderness. There is no guarding.   Skin:     General: Skin is warm.      Findings: No erythema.   Neurological:      General: No focal deficit present.      Mental Status: He is alert and oriented to person, place, and time.       Significant Labs: All pertinent labs within the past 24 hours have been reviewed.    Significant Imaging: I have reviewed all pertinent imaging results/findings within the past 24 hours.

## 2022-11-27 NOTE — ASSESSMENT & PLAN NOTE
-was given Rocephin IV x 1 dose at [facility]  -order Ciprofloxacin IV x 5 days  -UA showed +2 protein, WBC 15, bacteria, culture pending  -cont IVF hydration  -monitor strict I&Os    11/27:  abx changed to rocephin  Urine culture pending

## 2022-11-28 LAB
ANION GAP SERPL CALC-SCNC: 12 MMOL/L (ref 8–16)
BACTERIA UR CULT: NORMAL
BACTERIA UR CULT: NORMAL
BUN SERPL-MCNC: 69 MG/DL (ref 8–23)
CALCIUM SERPL-MCNC: 7.4 MG/DL (ref 8.7–10.5)
CHLORIDE SERPL-SCNC: 114 MMOL/L (ref 95–110)
CO2 SERPL-SCNC: 20 MMOL/L (ref 23–29)
CREAT SERPL-MCNC: 1.5 MG/DL (ref 0.5–1.4)
EST. GFR  (NO RACE VARIABLE): 50 ML/MIN/1.73 M^2
GLUCOSE SERPL-MCNC: 101 MG/DL (ref 70–110)
POTASSIUM SERPL-SCNC: 3.2 MMOL/L (ref 3.5–5.1)
SODIUM SERPL-SCNC: 146 MMOL/L (ref 136–145)

## 2022-11-28 PROCEDURE — 97530 THERAPEUTIC ACTIVITIES: CPT | Mod: CQ

## 2022-11-28 PROCEDURE — 63600175 PHARM REV CODE 636 W HCPCS: Performed by: NURSE PRACTITIONER

## 2022-11-28 PROCEDURE — 97110 THERAPEUTIC EXERCISES: CPT | Mod: CQ

## 2022-11-28 PROCEDURE — 21400001 HC TELEMETRY ROOM

## 2022-11-28 PROCEDURE — 25000003 PHARM REV CODE 250: Performed by: STUDENT IN AN ORGANIZED HEALTH CARE EDUCATION/TRAINING PROGRAM

## 2022-11-28 PROCEDURE — 63600175 PHARM REV CODE 636 W HCPCS: Performed by: STUDENT IN AN ORGANIZED HEALTH CARE EDUCATION/TRAINING PROGRAM

## 2022-11-28 PROCEDURE — 63600175 PHARM REV CODE 636 W HCPCS: Performed by: FAMILY MEDICINE

## 2022-11-28 PROCEDURE — 80048 BASIC METABOLIC PNL TOTAL CA: CPT | Performed by: NURSE PRACTITIONER

## 2022-11-28 PROCEDURE — 36415 COLL VENOUS BLD VENIPUNCTURE: CPT | Performed by: NURSE PRACTITIONER

## 2022-11-28 PROCEDURE — 25000003 PHARM REV CODE 250: Performed by: NURSE PRACTITIONER

## 2022-11-28 RX ORDER — SODIUM CHLORIDE, SODIUM LACTATE, POTASSIUM CHLORIDE, CALCIUM CHLORIDE 600; 310; 30; 20 MG/100ML; MG/100ML; MG/100ML; MG/100ML
INJECTION, SOLUTION INTRAVENOUS CONTINUOUS
Status: DISCONTINUED | OUTPATIENT
Start: 2022-11-28 | End: 2022-11-29 | Stop reason: HOSPADM

## 2022-11-28 RX ADMIN — TRIFLURIDINE 1 DROP: 10 SOLUTION OPHTHALMIC at 04:11

## 2022-11-28 RX ADMIN — TRIFLURIDINE 1 DROP: 10 SOLUTION OPHTHALMIC at 08:11

## 2022-11-28 RX ADMIN — ASPIRIN 81 MG: 81 TABLET, COATED ORAL at 08:11

## 2022-11-28 RX ADMIN — CEFTRIAXONE 1 G: 1 INJECTION, SOLUTION INTRAVENOUS at 12:11

## 2022-11-28 RX ADMIN — MICONAZOLE NITRATE: 20 CREAM TOPICAL at 08:11

## 2022-11-28 RX ADMIN — ENOXAPARIN SODIUM 40 MG: 40 INJECTION SUBCUTANEOUS at 05:11

## 2022-11-28 RX ADMIN — SODIUM CHLORIDE, SODIUM LACTATE, POTASSIUM CHLORIDE, AND CALCIUM CHLORIDE: .6; .31; .03; .02 INJECTION, SOLUTION INTRAVENOUS at 09:11

## 2022-11-28 RX ADMIN — SODIUM CHLORIDE, SODIUM LACTATE, POTASSIUM CHLORIDE, AND CALCIUM CHLORIDE: .6; .31; .03; .02 INJECTION, SOLUTION INTRAVENOUS at 08:11

## 2022-11-28 RX ADMIN — TRIFLURIDINE 1 DROP: 10 SOLUTION OPHTHALMIC at 03:11

## 2022-11-28 RX ADMIN — POTASSIUM BICARBONATE 40 MEQ: 391 TABLET, EFFERVESCENT ORAL at 08:11

## 2022-11-28 RX ADMIN — VALACYCLOVIR HYDROCHLORIDE 500 MG: 500 TABLET, FILM COATED ORAL at 08:11

## 2022-11-28 NOTE — SUBJECTIVE & OBJECTIVE
Review of Systems    Constitutional:  Positive for fatigue. Negative for fever.   HENT:  Negative for sinus pressure.    Eyes:  Negative for visual disturbance.   Respiratory:  Negative for shortness of breath.    Cardiovascular:  Negative for chest pain.   Gastrointestinal:  Negative for nausea and vomiting.   Genitourinary:  Negative for difficulty urinating.   Musculoskeletal:  Negative for back pain.   Skin:  Negative for rash.   Neurological:  Positive for weakness. Negative for headaches.   Psychiatric/Behavioral:  Negative for confusion.      Objective:     Vital Signs (Most Recent):  Temp: 97.7 °F (36.5 °C) (11/28/22 0735)  Pulse: 72 (11/28/22 0735)  Resp: 18 (11/28/22 0735)  BP: 119/60 (11/28/22 0735)  SpO2: 96 % (11/28/22 0735) Vital Signs (24h Range):  Temp:  [97.5 °F (36.4 °C)-98.8 °F (37.1 °C)] 97.7 °F (36.5 °C)  Pulse:  [66-73] 72  Resp:  [17-19] 18  SpO2:  [94 %-96 %] 96 %  BP: (113-126)/(55-60) 119/60     Weight: 98.2 kg (216 lb 7.9 oz)  Body mass index is 29.36 kg/m².    Intake/Output Summary (Last 24 hours) at 11/28/2022 1328  Last data filed at 11/27/2022 1901  Gross per 24 hour   Intake 3019.66 ml   Output --   Net 3019.66 ml      Physical Exam    Constitutional:       Appearance: Normal appearance.   HENT:      Head: Normocephalic.      Mouth/Throat:      Mouth: Mucous membranes are dry.   Eyes:      Pupils: Pupils are equal, round, and reactive to light.   Cardiovascular:      Rate and Rhythm: Normal rate and regular rhythm.      Heart sounds: No murmur heard.  Pulmonary:      Effort: Pulmonary effort is normal.      Breath sounds: Normal breath sounds.   Abdominal:      General: Bowel sounds are normal.      Palpations: Abdomen is soft.   Musculoskeletal:      weakness noted;   Skin:     General: Skin is warm and dry.      Comments: Candida rash in bilateral groins and on left leg, scattered bruises and scabs    Neurological:      General: No focal deficit present.      Mental Status: He  is alert and oriented to person, place, and time.      Cranial Nerves: Facial asymmetry present.      Motor: Weakness and abnormal muscle tone present.      Gait: Gait abnormal.      Comments: Speech impairment   Psychiatric:         Mood and Affect: Mood normal.         Behavior: Behavior normal.       Significant Labs: All pertinent labs within the past 24 hours have been reviewed.  CBC:   Recent Labs   Lab 11/27/22  1014   WBC 11.50   HGB 10.8*   HCT 31.2*        CMP:   Recent Labs   Lab 11/27/22  0524 11/28/22  0501    146*   K 2.7* 3.2*    114*   CO2 22* 20*   * 101   * 69*   CREATININE 2.1* 1.5*   CALCIUM 7.2* 7.4*   ANIONGAP 11 12       Significant Imaging:     Imaging Results    None

## 2022-11-28 NOTE — PLAN OF CARE
11/28/22 1219   Post-Acute Status   Post-Acute Authorization Placement   Post-Acute Placement Status Pending payor review/awaiting authorization (if required)   Discharge Delays None known at this time   Discharge Plan   Discharge Plan A Rehab     [facility] submitting for rehab authorization. Pt and sister, Roxi, updated on plan.  SW to f/u on auth status.

## 2022-11-28 NOTE — PLAN OF CARE
Pt needed MIN A for supine to sit. He stood with MIN A and took steps to the chair. Progressing with mobility but still weak. Motivated. Good rehab potential.

## 2022-11-28 NOTE — PT/OT/SLP PROGRESS
"Physical Therapy  Treatment    Chevy Skaggs   MRN: [id number]   Admitting Diagnosis: REGAN (acute kidney injury)    PT Received On: 11/28/22  PT Start Time: 1350     PT Stop Time: 1420    PT Total Time (min): 30 min       Billable Minutes:  Therapeutic Activity 15 and Therapeutic Exercise 15    Treatment Type: Treatment  PT/PTA: PTA     PTA Visit Number: 1       General Precautions: Standard, fall  Orthopedic Precautions: N/A   Braces: N/A  Respiratory Status: Room air         Subjective:  Communicated with epic  prior to session. Wound care came in the room with me. We notified staff that he needed to be changed. Heavily wet with urine.   "I tried using that external catheter but it gave me a rash" ( he is unable to use a urinal)      Pain/Comfort  Pain Rating 1: 5/10  Location 1: back  Pain Addressed 1: Reposition  Pain Rating Post-Intervention 1: 5/10    Objective:   Patient found with: bed alarm, peripheral IV, telemetry    Functional Mobility:  Bed Mobility:     Rolling: MIN    Supine to sit: MIN - Mod A    Transfers:    Sit to stand : MIN A   Bed to chair. MOD A    Gait:     Only able to take a few shuffled steps to the chair today. NO device used. I assisted him on one side and he reached for the chair with his other.         Treatment and Education:  Seated in the chair: 10 reps of active ap, tke, hf, hip ab/addcution. Prior to OOB. Assisted pt with rolling L and R to change his brief and don a dry one. Needs MIN A and cues for technique.      AM-PAC 6 CLICK MOBILITY  How much help from another person does this patient currently need?   1 = Unable, Total/Dependent Assistance  2 = A lot, Maximum/Moderate Assistance  3 = A little, Minimum/Contact Guard/Supervision  4 = None, Modified [place]/Independent    Turning over in bed (including adjusting bedclothes, sheets and blankets)?: 3  Sitting down on and standing up from a chair with arms (e.g., wheelchair, bedside commode, etc.): 3  Moving from lying on back " to sitting on the side of the bed?: 3  Moving to and from a bed to a chair (including a wheelchair)?: 2  Need to walk in hospital room?: 1  Climbing 3-5 steps with a railing?: 1  Basic Mobility Total Score: 13    AM-PAC Raw Score CMS G-Code Modifier Level of Impairment Assistance   6 % Total / Unable   7 - 9 CM 80 - 100% Maximal Assist   10 - 14 CL 60 - 80% Moderate Assist   15 - 19 CK 40 - 60% Moderate Assist   20 - 22 CJ 20 - 40% Minimal Assist   23 CI 1-20% SBA / CGA   24 CH 0% Independent/ Mod I     Patient left up in chair with all lines intact, call button in reach, and chair alarm on.    Assessment:  Chevy Skaggs is a 71 y.o. male with a medical diagnosis of REGAN (acute kidney injury) and presents with improving mobility but he is still a good rehab candidate and will greatly benefit from the aggressive therapy to address his deficits..    Rehab identified problem list/impairments: Rehab identified problem list/impairments: weakness, impaired balance, decreased safety awareness, impaired endurance, impaired self care skills, impaired functional mobility, gait instability, decreased lower extremity function, impaired fine motor, decreased upper extremity function    Rehab potential is good.    Activity tolerance: Good    Discharge recommendations: Discharge Facility/Level of Care Needs: rehabilitation facility     Barriers to discharge:      Equipment recommendations: Equipment Needed After Discharge: other (see comments) (TO be determined at next level of care)     GOALS:   Multidisciplinary Problems       Physical Therapy Goals          Problem: Physical Therapy    Goal Priority Disciplines Outcome Goal Variances Interventions   Physical Therapy Goal     PT, PT/OT Ongoing, Progressing     Description: LTG'S TO BE MET IN 14 DAYS (12-11-22)  PT WILL REQUIRE CGA FOR BED MOBILITY  PT WILL REQUIRE CRYSTAL FOR BED<>CHAIR TF'S  PT WILL AMB 50 FEET WITH RW AND CRYSTAL                           PLAN:    Patient to  be seen 3 x/week  to address the above listed problems via gait training, therapeutic activities, therapeutic exercises  Plan of Care expires: 12/11/22  Plan of Care reviewed with: patient         11/28/2022

## 2022-11-28 NOTE — PROGRESS NOTES
"[facility] ([facility] Medicine  Progress Note    Patient Name: Chevy Skaggs  MRN: [id number]  Patient Class: IP- Inpatient   Admission Date: 2022  Length of Stay: 1 days  Attending Physician: Leo Alvarado, *  Primary Care Provider: Ed Giron MD        Subjective:     Principal Problem:REGAN (acute kidney injury)        HPI:  70 y/o male with PMH of cerebral palsy s/p infantile meningitis, HTN, HLD, and herpes simplex of the eye presented to [facility] ER with complaints of generalized weakness and lightheadedness. He reports he usually ambulates with a walker, but the past few days has not been able to and has been "scooting himself" all over his house. He lives alone currently, was living with his mom and sister, but they both passed away the last 2 months. He has a neighbor that is a cousin that helps out when he calls them. He says he was having diarrhea the last week or so that stopped a few days ago, then he started having some weakness. His labs were WBC 16.16, K 3.4, Cr 2.8 and a UTI with hypotension on admission. He was given a dose of Rocephin IV x 1. He was transferred over to [facility] for higher level of care.   On exam, he was lying in bed, the room smells of urine, nurse had to clean him up when he arrived to floor and placed a male purewick to help keep him dry, has a candida rash to bilateral groins, and on left leg. He reports having difficulty taking care of himself since his mom and sister . He will likely need placement on discharge.  was consulted.       Overview/Hospital Course:  : pt still with hypokalemia, will need further replacement. Pt/ot on case. Likely needs snf. Cont rocephin for UTI. Procal and leukocytosis noted. Will check chest xray. Add azithromycin if pna noted.   :  Patient alert and oriented x3, potassium improving, ordered replacement, given hypernatremia-ordered IV fluids, PT OT evaluated and recommended rehab.  " Follow up with , pending authorization.  Antibiotics for UTI, creatinine trending down;           Review of Systems    Constitutional:  Positive for fatigue. Negative for fever.   HENT:  Negative for sinus pressure.    Eyes:  Negative for visual disturbance.   Respiratory:  Negative for shortness of breath.    Cardiovascular:  Negative for chest pain.   Gastrointestinal:  Negative for nausea and vomiting.   Genitourinary:  Negative for difficulty urinating.   Musculoskeletal:  Negative for back pain.   Skin:  Negative for rash.   Neurological:  Positive for weakness. Negative for headaches.   Psychiatric/Behavioral:  Negative for confusion.      Objective:     Vital Signs (Most Recent):  Temp: 97.7 °F (36.5 °C) (11/28/22 0735)  Pulse: 72 (11/28/22 0735)  Resp: 18 (11/28/22 0735)  BP: 119/60 (11/28/22 0735)  SpO2: 96 % (11/28/22 0735) Vital Signs (24h Range):  Temp:  [97.5 °F (36.4 °C)-98.8 °F (37.1 °C)] 97.7 °F (36.5 °C)  Pulse:  [66-73] 72  Resp:  [17-19] 18  SpO2:  [94 %-96 %] 96 %  BP: (113-126)/(55-60) 119/60     Weight: 98.2 kg (216 lb 7.9 oz)  Body mass index is 29.36 kg/m².    Intake/Output Summary (Last 24 hours) at 11/28/2022 1328  Last data filed at 11/27/2022 1901  Gross per 24 hour   Intake 3019.66 ml   Output --   Net 3019.66 ml      Physical Exam    Constitutional:       Appearance: Normal appearance.   HENT:      Head: Normocephalic.      Mouth/Throat:      Mouth: Mucous membranes are dry.   Eyes:      Pupils: Pupils are equal, round, and reactive to light.   Cardiovascular:      Rate and Rhythm: Normal rate and regular rhythm.      Heart sounds: No murmur heard.  Pulmonary:      Effort: Pulmonary effort is normal.      Breath sounds: Normal breath sounds.   Abdominal:      General: Bowel sounds are normal.      Palpations: Abdomen is soft.   Musculoskeletal:      weakness noted;   Skin:     General: Skin is warm and dry.      Comments: Candida rash in bilateral groins and on left leg,  scattered bruises and scabs    Neurological:      General: No focal deficit present.      Mental Status: He is alert and oriented to person, place, and time.      Cranial Nerves: Facial asymmetry present.      Motor: Weakness and abnormal muscle tone present.      Gait: Gait abnormal.      Comments: Speech impairment   Psychiatric:         Mood and Affect: Mood normal.         Behavior: Behavior normal.       Significant Labs: All pertinent labs within the past 24 hours have been reviewed.  CBC:   Recent Labs   Lab 11/27/22  1014   WBC 11.50   HGB 10.8*   HCT 31.2*        CMP:   Recent Labs   Lab 11/27/22  0524 11/28/22  0501    146*   K 2.7* 3.2*    114*   CO2 22* 20*   * 101   * 69*   CREATININE 2.1* 1.5*   CALCIUM 7.2* 7.4*   ANIONGAP 11 12       Significant Imaging:     Imaging Results    None            Assessment/Plan:      * REGAN (acute kidney injury)  Patient with acute kidney injury likely due to IVVD/dehydration REGAN is currently stable. Labs reviewed- Renal function/electrolytes with Estimated Creatinine Clearance: 39.7 mL/min (A) (based on SCr of 2.1 mg/dL (H)). according to latest data.   -Monitor urine output and serial BMP and adjust therapy as needed. Avoid nephrotoxins and renally dose meds for GFR listed above.     11/27:  Cont IVF  Creatinine trending down     Generalized weakness  Will likely need placement  Pt/ot pending       Hypokalemia  Cont to replete       Uremia  -see above    Candida rash of groin  -give Diflucan PO x 1 dose  -order miconazole topical BID to groin and leg  -cont male purewick to keep perineal dry    Weakness of both lower extremities  -likely r/t acute infection and recent diarrhea causing dehydration  -order PT/OT  -consult , will likely need placement on discharge    Herpes simplex infection of eye  -patient says he gets this often requiring treatment  -cont valacyclovir PO and trifluridine ophtha gtts  -follow up with PCP and  ophthalmology on d/c    Mixed hyperlipidemia  -hold statin for now      Essential hypertension  -Monitor VS routinely  -hypotensive on arrival d/t IVVD  -hold all home anti-hypertensives for now, restart when appropriate  -optimize pain control    Other cerebral palsy  Patient said he had meningitis when he was born and developed cerebral palsy. Normally ambulates with a walker. Recently lost his mother and sister who helped care for him and he has been having difficulty caring for himself. Has several bruises and scabs. Will likely not be able to live alone and will need placement on discharge.  -PT/OT to eval and treat  -fall precautions    Acute cystitis without hematuria  -was given Rocephin IV x 1 dose at [facility] ER  -order Ciprofloxacin IV x 5 days  -UA showed +2 protein, WBC 15, bacteria, culture pending  -cont IVF hydration  -monitor strict I&Os    11/27:  abx changed to rocephin  Urine culture pending       VTE Risk Mitigation (From admission, onward)         Ordered     enoxaparin injection 40 mg  Daily         11/26/22 1705     IP VTE HIGH RISK PATIENT  Once         11/26/22 1705     Place sequential compression device  Until discontinued         11/26/22 1705                Discharge Planning   DON:      Code Status: DNR   Is the patient medically ready for discharge?:     Reason for patient still in hospital (select all that apply):  Hypernatremia, IV fluids, IV antibiotics for UTI, pending placement.  Discharge Plan A: Rehab   Discharge Delays: None known at this time              Leo Alvarado MD  Department of Hospital Medicine   [facility] - Telemetry ([facility])

## 2022-11-28 NOTE — PLAN OF CARE
[facility] - Tele[facility] (Hospital)  Initial Discharge Assessment    Anticipated DC dispo: Rehab  Prior Level of Function: Home alone with RW and assistance from cousin, lives next door  PCP: Ed Giron MD      Comments: SW discussed PT/OT recommendations for rehab placement. SW explained that [facility] ([place] clinically accepting and submitted for authorization. SW discussed alt discharge plan for SNF if rehab denied. Family understanding.     Pt's whiteboard updated to reflect discharge disposition and CM contact information.   SW to continue following.      Primary Care Provider: Ed Giron MD    Admission Diagnosis: Uremia [N19]  REGAN (acute kidney injury) [N17.9]  Acute UTI (urinary tract infection) [N39.0]    Admission Date: 11/26/2022  Expected Discharge Date:     Discharge Barriers Identified: None    Payor: BLUE CROSS BLUE SHIELD / Plan: BCBS [facility] LOCAL PLUS / Product Type: Commercial /     Extended Emergency Contact Information  Primary Emergency Contact: cy lott  Mobile Phone: [phone]  Relation: Spouse  Preferred language: English   needed? No  Secondary Emergency Contact: Roxi Gross  Mobile Phone: [phone]  Relation: Sister  Preferred language: English   needed? No    Discharge Plan A: Rehab       No Pharmacies Listed    Initial Assessment (most recent)       Adult Discharge Assessment - 11/28/22 1220          Discharge Assessment    Assessment Type Discharge Planning Assessment     Confirmed/corrected address, phone number and insurance Yes     Confirmed Demographics Correct on Facesheet     Source of Information patient;family     Communicated DON with patient/caregiver Date not available/Unable to determine     Reason For Admission REGAN     Lives With alone     Facility Arrived From: Home     Do you expect to return to your current living situation? Yes     Do you have help at home or someone to help you manage your care at  home? Yes     Who are your caregiver(s) and their phone number(s)? Pt's cousin (lives next door) and  sister, Roxi (lives in [facility] area)     Prior to hospitilization cognitive status: Alert/Oriented     Walking or Climbing Stairs Difficulty ambulation difficulty, requires equipment     Mobility Management RW and cane     Equipment Currently Used at Home walker, rolling;cane, quad;bedside commode;grab bar;bath bench     Readmission within 30 days? No     Patient currently being followed by outpatient case management? No     Do you currently have service(s) that help you manage your care at home? No     Do you take prescription medications? Yes     Do you have prescription coverage? Yes     Do you have any problems affording any of your prescribed medications? No     Is the patient taking medications as prescribed? yes     Who is going to help you get home at discharge? TBD on disposition     How do you get to doctors appointments? family or friend will provide     Are you on dialysis? No     Do you take coumadin? No     Discharge Plan A Rehab     DME Needed Upon Discharge  none     Discharge Plan discussed with: Patient;Sibling     Discharge Barriers Identified None

## 2022-11-28 NOTE — PLAN OF CARE
Ongoing (interventions implemented as appropriate)  Pt AAO x4.  VSS  Pt able to make needs known.  Pt remained afebrile throughout this shift.   Pt spends most of shift in bed  Pt remained free of falls this shift.   Pt denies pain this shift.  Plan of care reviewed. Patient verbalizes understanding.   Pt moving/turing by staff. Frequent weight shifting encouraged.  Patient sinus rhythm on monitor.   Bed low, side rails up x 2, wheels locked, call light in reach.   Hourly rounding completed.   Will continue to monitor.

## 2022-11-29 VITALS
HEART RATE: 85 BPM | OXYGEN SATURATION: 99 % | HEIGHT: 72 IN | BODY MASS INDEX: 29.44 KG/M2 | TEMPERATURE: 99 F | SYSTOLIC BLOOD PRESSURE: 129 MMHG | DIASTOLIC BLOOD PRESSURE: 60 MMHG | RESPIRATION RATE: 18 BRPM | WEIGHT: 217.38 LBS

## 2022-11-29 LAB
ANION GAP SERPL CALC-SCNC: 12 MMOL/L (ref 8–16)
BASOPHILS # BLD AUTO: 0.07 K/UL (ref 0–0.2)
BASOPHILS NFR BLD: 0.5 % (ref 0–1.9)
BUN SERPL-MCNC: 33 MG/DL (ref 8–23)
CALCIUM SERPL-MCNC: 7.9 MG/DL (ref 8.7–10.5)
CHLORIDE SERPL-SCNC: 111 MMOL/L (ref 95–110)
CO2 SERPL-SCNC: 22 MMOL/L (ref 23–29)
CREAT SERPL-MCNC: 1.1 MG/DL (ref 0.5–1.4)
DIFFERENTIAL METHOD: ABNORMAL
EOSINOPHIL # BLD AUTO: 0.3 K/UL (ref 0–0.5)
EOSINOPHIL NFR BLD: 1.7 % (ref 0–8)
ERYTHROCYTE [DISTWIDTH] IN BLOOD BY AUTOMATED COUNT: 16.3 % (ref 11.5–14.5)
EST. GFR  (NO RACE VARIABLE): >60 ML/MIN/1.73 M^2
GLUCOSE SERPL-MCNC: 100 MG/DL (ref 70–110)
HCT VFR BLD AUTO: 32.6 % (ref 40–54)
HGB BLD-MCNC: 10.6 G/DL (ref 14–18)
IMM GRANULOCYTES # BLD AUTO: 0.6 K/UL (ref 0–0.04)
IMM GRANULOCYTES NFR BLD AUTO: 4.2 % (ref 0–0.5)
LYMPHOCYTES # BLD AUTO: 4.1 K/UL (ref 1–4.8)
LYMPHOCYTES NFR BLD: 28.3 % (ref 18–48)
MCH RBC QN AUTO: 33.2 PG (ref 27–31)
MCHC RBC AUTO-ENTMCNC: 32.5 G/DL (ref 32–36)
MCV RBC AUTO: 102 FL (ref 82–98)
MONOCYTES # BLD AUTO: 1 K/UL (ref 0.3–1)
MONOCYTES NFR BLD: 7.2 % (ref 4–15)
NEUTROPHILS # BLD AUTO: 8.3 K/UL (ref 1.8–7.7)
NEUTROPHILS NFR BLD: 58.1 % (ref 38–73)
NRBC BLD-RTO: 0 /100 WBC
PLATELET # BLD AUTO: 273 K/UL (ref 150–450)
PMV BLD AUTO: 10.2 FL (ref 9.2–12.9)
POTASSIUM SERPL-SCNC: 4 MMOL/L (ref 3.5–5.1)
RBC # BLD AUTO: 3.19 M/UL (ref 4.6–6.2)
SARS-COV-2 RDRP RESP QL NAA+PROBE: NEGATIVE
SODIUM SERPL-SCNC: 145 MMOL/L (ref 136–145)
WBC # BLD AUTO: 14.31 K/UL (ref 3.9–12.7)

## 2022-11-29 PROCEDURE — 97110 THERAPEUTIC EXERCISES: CPT

## 2022-11-29 PROCEDURE — 80048 BASIC METABOLIC PNL TOTAL CA: CPT | Performed by: NURSE PRACTITIONER

## 2022-11-29 PROCEDURE — 36415 COLL VENOUS BLD VENIPUNCTURE: CPT | Performed by: NURSE PRACTITIONER

## 2022-11-29 PROCEDURE — 85025 COMPLETE CBC W/AUTO DIFF WBC: CPT | Performed by: STUDENT IN AN ORGANIZED HEALTH CARE EDUCATION/TRAINING PROGRAM

## 2022-11-29 PROCEDURE — 25000003 PHARM REV CODE 250: Performed by: NURSE PRACTITIONER

## 2022-11-29 PROCEDURE — 97530 THERAPEUTIC ACTIVITIES: CPT

## 2022-11-29 PROCEDURE — U0002 COVID-19 LAB TEST NON-CDC: HCPCS | Performed by: STUDENT IN AN ORGANIZED HEALTH CARE EDUCATION/TRAINING PROGRAM

## 2022-11-29 RX ORDER — DOXYLAMINE SUCCINATE 25 MG
TABLET ORAL 2 TIMES DAILY
Qty: 92 G | Refills: 0
Start: 2022-11-29

## 2022-11-29 RX ORDER — AMOXICILLIN AND CLAVULANATE POTASSIUM 875; 125 MG/1; MG/1
1 TABLET, FILM COATED ORAL 2 TIMES DAILY
Qty: 6 TABLET | Refills: 0
Start: 2022-11-29 | End: 2022-12-02

## 2022-11-29 RX ORDER — DOXYCYCLINE 100 MG/1
100 CAPSULE ORAL EVERY 12 HOURS
Qty: 10 CAPSULE | Refills: 0
Start: 2022-11-29 | End: 2022-12-04

## 2022-11-29 RX ADMIN — MICONAZOLE NITRATE: 20 CREAM TOPICAL at 08:11

## 2022-11-29 RX ADMIN — ASPIRIN 81 MG: 81 TABLET, COATED ORAL at 08:11

## 2022-11-29 RX ADMIN — TRIFLURIDINE 1 DROP: 10 SOLUTION OPHTHALMIC at 08:11

## 2022-11-29 RX ADMIN — VALACYCLOVIR HYDROCHLORIDE 500 MG: 500 TABLET, FILM COATED ORAL at 08:11

## 2022-11-29 NOTE — PLAN OF CARE
Aox4. Able to verbalize needs & follow commands. Calm & cooperative throughout shift.   POC reviewed with pt. Interventions implemented as appropriate.    VSS; not on tele-monitor.  On RA.    R-hand PIV patent. Integrity maintained. LR infusing at 100 mL/hr.  Receiving IV abx. No adverse reactions noted.  Yeast rash to groin. Tx cream at bedside.  No c/o pain.    Incontinence pad changed as needed. Urinal w/in reach.    Lovenox for VTE.  Activity ad harmony. Frequent position changes encouraged. Able to reposition in bed independently.  Educated on s/sx of pressure injury;  verbalized understanding.  NADN. Resting quietly in bed.   Free of falls. Hourly rounding complete.   All safety measures remain in place. SR up x2; bed low & locked. Bed alarm on and audible. Call light w/in reach.   Will continue to monitor throughout shift.

## 2022-11-29 NOTE — DISCHARGE SUMMARY
"[facility] - Telemetry ([facility])  [facility] Medicine  Discharge Summary      Patient Name: Chevy Skaggs  MRN: [id number]  [facility]: [id number]  Patient Class: IP- Inpatient  Admission Date: 2022  Hospital Length of Stay: 2 days  Discharge Date and Time: 2022  Attending Physician: Leo Alvarado, *   Discharging Provider: Leo Alvarado MD  Primary Care Provider: Ed Giron MD    Primary Care Team: Networked reference to record PCT     HPI:   70 y/o male with PMH of cerebral palsy s/p infantile meningitis, HTN, HLD, and herpes simplex of the eye presented to [facility] ER with complaints of generalized weakness and lightheadedness. He reports he usually ambulates with a walker, but the past few days has not been able to and has been "scooting himself" all over his house. He lives alone currently, was living with his mom and sister, but they both passed away the last 2 months. He has a neighbor that is a cousin that helps out when he calls them. He says he was having diarrhea the last week or so that stopped a few days ago, then he started having some weakness. His labs were WBC 16.16, K 3.4, Cr 2.8 and a UTI with hypotension on admission. He was given a dose of Rocephin IV x 1. He was transferred over to [facility] for higher level of care.   On exam, he was lying in bed, the room smells of urine, nurse had to clean him up when he arrived to floor and placed a male purewick to help keep him dry, has a candida rash to bilateral groins, and on left leg. He reports having difficulty taking care of himself since his mom and sister . He will likely need placement on discharge.  was consulted.       * No surgery found *      Hospital Course:   : pt still with hypokalemia, will need further replacement. Pt/ot on case. Likely needs snf. Cont rocephin for UTI. Procal and leukocytosis noted. Will check chest xray. Add azithromycin if pna noted.   :  Patient alert and oriented " x3, potassium improving, ordered replacement, given hypernatremia-ordered IV fluids, PT OT evaluated and recommended rehab.  Follow up with , pending authorization.  Antibiotics for UTI, creatinine trending down;   11/29:  Patient alert and oriented x3, denied acute issues, complaining of chronic back pain. Afebrile;  Sodium trended down, creatinine trended down to 1.1, given findings of UTI, strongly recommended to continue antibiotics upon discharge to rehab.  Recommended compliance with physical therapy, occupation therapy.    At rehab, recommend follow up on labs, continue PT OT, wound care, maintain hydration, q2 hourly turns in the bed; considering clinical and hemodynamic stability planning to discharge patient to rehab today,  working on arrangements.     Review of Systems     Constitutional:  Positive for fatigue. Negative for fever.   HENT:  Negative for sinus pressure.    Eyes:  Negative for visual disturbance.   Respiratory:  Negative for shortness of breath.    Cardiovascular:  Negative for chest pain.   Gastrointestinal:  Negative for nausea and vomiting.   Genitourinary:  Negative for difficulty urinating.   Musculoskeletal:  Negative for back pain.   Skin:  Negative for rash.   Neurological:  Positive for weakness. Negative for headaches.   Psychiatric/Behavioral:  Negative for confusion.    Physical Exam     Constitutional:       Appearance: Normal appearance.   HENT:      Head: Normocephalic.      Mouth/Throat:      Mouth: Mucous membranes are dry.   Eyes:      Pupils: Pupils are equal, round, and reactive to light.   Cardiovascular:      Rate and Rhythm: Normal rate and regular rhythm.      Heart sounds: No murmur heard.  Pulmonary:      Effort: Pulmonary effort is normal.      Breath sounds: Normal breath sounds.   Abdominal:      General: Bowel sounds are normal.      Palpations: Abdomen is soft.   Musculoskeletal:      weakness noted;   Skin:     General: Skin is  warm and dry.      Comments: Candida rash in bilateral groins and on left leg, scattered bruises and scabs    Neurological:      General: No focal deficit present.      Mental Status: He is alert and oriented to person, place, and time.       Motor: Weakness and abnormal muscle tone present.      Psychiatric:         Mood and Affect: Mood normal.         Behavior: Behavior normal.       Goals of Care Treatment Preferences:  Code Status: DNR      Consults:   Consults (From admission, onward)        Status Ordering Provider     Inpatient consult to Social Work  Once        Provider:  (Not yet assigned)    Completed TAMARA MORENO     Inpatient consult to Social Work  Once        Provider:  (Not yet assigned)    Completed MINH DAVIDSON     Inpatient consult to Registered Dietitian/Nutritionist  Once        Provider:  (Not yet assigned)    Completed MINH DAVIDSON          No new Assessment & Plan notes have been filed under this hospital service since the last note was generated.  Service: Hospital Medicine    Final Active Diagnoses:    Diagnosis Date Noted POA    PRINCIPAL PROBLEM:  REGAN (acute kidney injury) [N17.9] 11/26/2022 Yes    Hypokalemia [E87.6] 11/27/2022 Yes    Generalized weakness [R53.1] 11/27/2022 Yes    Acute cystitis without hematuria [N30.00] 11/26/2022 Yes    Other cerebral palsy [G80.8] 11/26/2022 Yes    Essential hypertension [I10] 11/26/2022 Yes    Mixed hyperlipidemia [E78.2] 11/26/2022 Yes    Herpes simplex infection of eye [B00.50] 11/26/2022 Yes    Weakness of both lower extremities [R29.898] 11/26/2022 Yes    Candida rash of groin [B37.89] 11/26/2022 Yes    Uremia [N19] 11/26/2022 Yes      Problems Resolved During this Admission:       Discharged Condition: stable    Disposition: Home or Self Care    Follow Up:   Follow-up Information     Ed Giron MD Follow up in 1 week(s).    Specialty: Internal Medicine  Contact information:  [address] [facility] [address] [place]  [id number]  [phone]                       Patient Instructions:      Notify your health care provider if you experience any of the following:  temperature >100.4     Notify your health care provider if you experience any of the following:  persistent nausea and vomiting or diarrhea     Notify your health care provider if you experience any of the following:  severe uncontrolled pain     Notify your health care provider if you experience any of the following:  redness, tenderness, or signs of infection (pain, swelling, redness, odor or green/yellow discharge around incision site)     Activity as tolerated       Significant Diagnostic Studies:     Results for orders placed or performed during the hospital encounter of 11/26/22   Urine culture    Specimen: Urine   Result Value Ref Range    Urine Culture, Routine       Multiple organisms isolated. None in predominance.  Repeat if    Urine Culture, Routine clinically necessary.    Urinalysis, Reflex to Urine Culture Urine, Clean Catch    Specimen: Urine   Result Value Ref Range    Specimen UA Urine, Clean Catch     Color, UA Yellow Yellow, Straw, Mary    Appearance, UA Cloudy (A) Clear    pH, UA 8.0 5.0 - 8.0    Specific Gravity, UA 1.010 1.005 - 1.030    Protein, UA 2+ (A) Negative    Glucose, UA Negative Negative    Ketones, UA Negative Negative    Bilirubin (UA) Negative Negative    Occult Blood UA 3+ (A) Negative    Nitrite, UA Negative Negative    Urobilinogen, UA Negative <2.0 EU/dL    Leukocytes, UA 2+ (A) Negative   CBC auto differential   Result Value Ref Range    WBC 16.16 (H) 3.90 - 12.70 K/uL    RBC 3.67 (L) 4.60 - 6.20 M/uL    Hemoglobin 12.4 (L) 14.0 - 18.0 g/dL    Hematocrit 36.2 (L) 40.0 - 54.0 %    MCV 99 (H) 82 - 98 fL    MCH 33.8 (H) 27.0 - 31.0 pg    MCHC 34.3 32.0 - 36.0 g/dL    RDW 15.3 (H) 11.5 - 14.5 %    Platelets 228 150 - 450 K/uL    MPV 11.5 9.2 - 12.9 fL    Immature Granulocytes 1.5 (H) 0.0 - 0.5 %    Gran # (ANC) 11.7 (H) 1.8 - 7.7 K/uL     Immature Grans (Abs) 0.25 (H) 0.00 - 0.04 K/uL    Lymph # 2.9 1.0 - 4.8 K/uL    Mono # 1.2 (H) 0.3 - 1.0 K/uL    Eos # 0.0 0.0 - 0.5 K/uL    Baso # 0.07 0.00 - 0.20 K/uL    nRBC 0 0 /100 WBC    Gran % 72.7 38.0 - 73.0 %    Lymph % 18.1 18.0 - 48.0 %    Mono % 7.1 4.0 - 15.0 %    Eosinophil % 0.2 0.0 - 8.0 %    Basophil % 0.4 0.0 - 1.9 %    Differential Method Automated    Comprehensive metabolic panel   Result Value Ref Range    Sodium 142 136 - 145 mmol/L    Potassium 3.4 (L) 3.5 - 5.1 mmol/L    Chloride 104 95 - 110 mmol/L    CO2 23 23 - 29 mmol/L    Glucose 111 (H) 70 - 110 mg/dL     (H) 8 - 23 mg/dL    Creatinine 2.8 (H) 0.5 - 1.4 mg/dL    Calcium 8.4 (L) 8.7 - 10.5 mg/dL    Total Protein 7.0 6.0 - 8.4 g/dL    Albumin 3.1 (L) 3.5 - 5.2 g/dL    Total Bilirubin 0.9 0.1 - 1.0 mg/dL    Alkaline Phosphatase 77 55 - 135 U/L     (H) 10 - 40 U/L     (H) 10 - 44 U/L    Anion Gap 15 8 - 16 mmol/L    eGFR 23.7 (A) >60 mL/min/1.73 m^2   HIV 1/2 Ag/Ab (4th Gen)   Result Value Ref Range    HIV 1/2 Ag/Ab Negative Negative   Hepatitis C Antibody   Result Value Ref Range    Hepatitis C Ab Negative Negative   HCV Virus Hold Specimen   Result Value Ref Range    HEP C Virus Hold Specimen Hold for HCV sendout    Urinalysis Microscopic   Result Value Ref Range    RBC, UA 15 (H) 0 - 4 /hpf    WBC, UA 15 (H) 0 - 5 /hpf    WBC Clumps, UA Few (A) None-Rare    Bacteria Many (A) None-Occ /hpf    Hyaline Casts, UA 0 0-1/lpf /lpf    Microscopic Comment SEE COMMENT    Magnesium   Result Value Ref Range    Magnesium 3.1 (H) 1.6 - 2.6 mg/dL   Procalcitonin   Result Value Ref Range    Procalcitonin 5.37 (H) <0.25 ng/mL   Lactic acid, plasma   Result Value Ref Range    Lactate (Lactic Acid) 0.8 0.5 - 2.2 mmol/L   Basic Metabolic Panel (BMP)   Result Value Ref Range    Sodium 140 136 - 145 mmol/L    Potassium 2.7 (LL) 3.5 - 5.1 mmol/L    Chloride 107 95 - 110 mmol/L    CO2 22 (L) 23 - 29 mmol/L    Glucose 132 (H) 70 - 110  mg/dL     (H) 8 - 23 mg/dL    Creatinine 2.1 (H) 0.5 - 1.4 mg/dL    Calcium 7.2 (L) 8.7 - 10.5 mg/dL    Anion Gap 11 8 - 16 mmol/L    eGFR 33 (A) >60 mL/min/1.73 m^2   CBC auto differential   Result Value Ref Range    WBC 11.50 3.90 - 12.70 K/uL    RBC 3.19 (L) 4.60 - 6.20 M/uL    Hemoglobin 10.8 (L) 14.0 - 18.0 g/dL    Hematocrit 31.2 (L) 40.0 - 54.0 %    MCV 98 82 - 98 fL    MCH 33.9 (H) 27.0 - 31.0 pg    MCHC 34.6 32.0 - 36.0 g/dL    RDW 15.4 (H) 11.5 - 14.5 %    Platelets 250 150 - 450 K/uL    MPV 11.1 9.2 - 12.9 fL    Immature Granulocytes 1.6 (H) 0.0 - 0.5 %    Gran # (ANC) 7.6 1.8 - 7.7 K/uL    Immature Grans (Abs) 0.18 (H) 0.00 - 0.04 K/uL    Lymph # 3.3 1.0 - 4.8 K/uL    Mono # 0.3 0.3 - 1.0 K/uL    Eos # 0.1 0.0 - 0.5 K/uL    Baso # 0.02 0.00 - 0.20 K/uL    nRBC 0 0 /100 WBC    Gran % 66.0 38.0 - 73.0 %    Lymph % 28.8 18.0 - 48.0 %    Mono % 2.6 (L) 4.0 - 15.0 %    Eosinophil % 0.8 0.0 - 8.0 %    Basophil % 0.2 0.0 - 1.9 %    Differential Method Automated    Basic Metabolic Panel (BMP)   Result Value Ref Range    Sodium 146 (H) 136 - 145 mmol/L    Potassium 3.2 (L) 3.5 - 5.1 mmol/L    Chloride 114 (H) 95 - 110 mmol/L    CO2 20 (L) 23 - 29 mmol/L    Glucose 101 70 - 110 mg/dL    BUN 69 (H) 8 - 23 mg/dL    Creatinine 1.5 (H) 0.5 - 1.4 mg/dL    Calcium 7.4 (L) 8.7 - 10.5 mg/dL    Anion Gap 12 8 - 16 mmol/L    eGFR 50 (A) >60 mL/min/1.73 m^2   Basic Metabolic Panel (BMP)   Result Value Ref Range    Sodium 145 136 - 145 mmol/L    Potassium 4.0 3.5 - 5.1 mmol/L    Chloride 111 (H) 95 - 110 mmol/L    CO2 22 (L) 23 - 29 mmol/L    Glucose 100 70 - 110 mg/dL    BUN 33 (H) 8 - 23 mg/dL    Creatinine 1.1 0.5 - 1.4 mg/dL    Calcium 7.9 (L) 8.7 - 10.5 mg/dL    Anion Gap 12 8 - 16 mmol/L    eGFR >60 >60 mL/min/1.73 m^2   CBC auto differential   Result Value Ref Range    WBC 14.31 (H) 3.90 - 12.70 K/uL    RBC 3.19 (L) 4.60 - 6.20 M/uL    Hemoglobin 10.6 (L) 14.0 - 18.0 g/dL    Hematocrit 32.6 (L) 40.0 - 54.0 %      (H) 82 - 98 fL    MCH 33.2 (H) 27.0 - 31.0 pg    MCHC 32.5 32.0 - 36.0 g/dL    RDW 16.3 (H) 11.5 - 14.5 %    Platelets 273 150 - 450 K/uL    MPV 10.2 9.2 - 12.9 fL    Immature Granulocytes 4.2 (H) 0.0 - 0.5 %    Gran # (ANC) 8.3 (H) 1.8 - 7.7 K/uL    Immature Grans (Abs) 0.60 (H) 0.00 - 0.04 K/uL    Lymph # 4.1 1.0 - 4.8 K/uL    Mono # 1.0 0.3 - 1.0 K/uL    Eos # 0.3 0.0 - 0.5 K/uL    Baso # 0.07 0.00 - 0.20 K/uL    nRBC 0 0 /100 WBC    Gran % 58.1 38.0 - 73.0 %    Lymph % 28.3 18.0 - 48.0 %    Mono % 7.2 4.0 - 15.0 %    Eosinophil % 1.7 0.0 - 8.0 %    Basophil % 0.5 0.0 - 1.9 %    Differential Method Automated    POCT Influenza A/B Molecular   Result Value Ref Range    POC Molecular Influenza A Ag Negative Negative, Not Reported    POC Molecular Influenza B Ag Negative Negative, Not Reported     Acceptable Yes        Pending Diagnostic Studies:     None           Imaging Results    None       Medications:       Medication List      START taking these medications    amoxicillin-clavulanate 875-125mg 875-125 mg per tablet  Commonly known as: AUGMENTIN  Take 1 tablet by mouth 2 (two) times daily. for 3 days     doxycycline 100 MG Cap  Commonly known as: VIBRAMYCIN  Take 1 capsule (100 mg total) by mouth every 12 (twelve) hours. for 5 days     miconazole 2 % cream  Commonly known as: MICOTIN  Apply topically 2 (two) times daily.        CONTINUE taking these medications    amLODIPine 5 MG tablet  Commonly known as: NORVASC     aspirin 81 MG EC tablet  Commonly known as: ECOTRIN     atorvastatin 10 MG tablet  Commonly known as: LIPITOR     benazepriL 40 MG tablet  Commonly known as: LOTENSIN     nebivoloL 20 mg Tab  Commonly known as: BYSTOLIC     trifluridine 1 % ophthalmic solution  Commonly known as: VIROPTIC     valACYclovir 500 MG tablet  Commonly known as: VALTREX        STOP taking these medications    hydroCHLOROthiazide 12.5 mg capsule  Commonly known as: MICROZIDE           Where to Get  Your Medications      Information about where to get these medications is not yet available    Ask your nurse or doctor about these medications  · amoxicillin-clavulanate 875-125mg 875-125 mg per tablet  · doxycycline 100 MG Cap  · miconazole 2 % cream         Indwelling Lines/Drains at time of discharge:   Lines/Drains/Airways     None                 Time spent on the discharge of patient: 63 minutes         Leo Alvarado MD  Department of Hospital Medicine  [facility] ([facility])

## 2022-11-29 NOTE — PLAN OF CARE
11/29/22 1158   Post-Acute Status   Post-Acute Authorization Placement   Post-Acute Placement Status Set-up Complete/Auth obtained   Discharge Delays None known at this time   Discharge Plan   Discharge Plan A Rehab     Authorization received for patient to discharge to [facility] ([place]   Patient and MD notified. SW to fax discharge clinicals once available. SW awaiting COVID test to result.     SW to f/u

## 2022-11-29 NOTE — PT/OT/SLP PROGRESS
Physical Therapy  Treatment    Chevy Skaggs   MRN: [id number]   Admitting Diagnosis: REGAN (acute kidney injury)    PT Received On: 11/29/22  PT Start Time: 1100     PT Stop Time: 1123    PT Total Time (min): 23 min       Billable Minutes:  Therapeutic Activity 13 and Therapeutic Exercise 10    Treatment Type: Treatment  PT/PTA: PT     PTA Visit Number: 0       General Precautions: Standard, fall  Orthopedic Precautions: N/A   Braces: N/A  Respiratory Status: Room air         Subjective:  Communicated with NURSE HERNANDEZ AND Epic CHART REVIEW  prior to session.  PT AGREED TO TX     Pain/Comfort  Pain Rating 1: 3/10  Location 1: back  Pain Rating Post-Intervention 1: 3/10    Objective:   Patient found with: peripheral IV, bed alarm, PureWick, telemetry    Functional Mobility:      Bed Mobility     LOG ROLLED TO RIGHT AND SEATED EOB WITH CGA AND INC TIME TO COMPLETE   Transfers:    SIT>STAND X 2 WITH RW AND MOD A.  STAND PIVOT T/F TO CHAIR WITH RW AND MOD A WITH CROUCHED POSTURE.   Gait:     UNABLE     Balance:   Static Sit: FAIR-: Maintains without assist but inconsistent   Dynamic Sit: FAIR: Cannot move trunk without losing balance  Static Stand: POOR: Needs MODERATE assist to maintain  Dynamic stand: POOR: Needs MOD (moderate) assist during gait       Treatment and Education:  PT COMPLETED SEATED TE 2X10 REPS OF AP, TKE, AND MIP WITH LIMITED ROM WITH EMPHASIS ON ROM AND STRENGTHENING. PT COMPLETED CHAIR PUSHUPS 2X5 REPS WITH EMPHASIS ON T/F TRAINING. P.T. CALLED FOR PCT AS PT AND BED SOILED AND NEEDING BATH. PT LEFT SEATED IN CHAIR WITH ALL NEEDS MET.      AM-PAC 6 CLICK MOBILITY  How much help from another person does this patient currently need?   1 = Unable, Total/Dependent Assistance  2 = A lot, Maximum/Moderate Assistance  3 = A little, Minimum/Contact Guard/Supervision  4 = None, Modified [place]/Independent    Turning over in bed (including adjusting bedclothes, sheets and blankets)?: 3  Sitting down on and  standing up from a chair with arms (e.g., wheelchair, bedside commode, etc.): 2  Moving from lying on back to sitting on the side of the bed?: 3  Moving to and from a bed to a chair (including a wheelchair)?: 2  Need to walk in hospital room?: 1  Climbing 3-5 steps with a railing?: 1  Basic Mobility Total Score: 12    AM-PAC Raw Score CMS G-Code Modifier Level of Impairment Assistance   6 % Total / Unable   7 - 9 CM 80 - 100% Maximal Assist   10 - 14 CL 60 - 80% Moderate Assist   15 - 19 CK 40 - 60% Moderate Assist   20 - 22 CJ 20 - 40% Minimal Assist   23 CI 1-20% SBA / CGA   24 CH 0% Independent/ Mod I     Patient left up in chair with call button in reach and chair alarm on.    Assessment:  PT ASHWIN TX WITH INC FATIGUE AT COMPLETION OF TX.     Rehab identified problem list/impairments: Rehab identified problem list/impairments: weakness, gait instability, impaired balance, decreased ROM, pain, impaired self care skills, impaired functional mobility, decreased safety awareness, decreased lower extremity function    Rehab potential is good.    Activity tolerance: Fair    Discharge recommendations: Discharge Facility/Level of Care Needs: rehabilitation facility     Barriers to discharge:      Equipment recommendations:       GOALS:   Multidisciplinary Problems       Physical Therapy Goals          Problem: Physical Therapy    Goal Priority Disciplines Outcome Goal Variances Interventions   Physical Therapy Goal     PT, PT/OT Ongoing, Progressing     Description: LTG'S TO BE MET IN 14 DAYS (12-11-22)  PT WILL REQUIRE CGA FOR BED MOBILITY  PT WILL REQUIRE [facility] FOR BED<>CHAIR TF'S  PT WILL AMB 50 FEET WITH RW AND CRYSTAL                           PLAN:    Patient to be seen 3 x/week  to address the above listed problems via gait training, therapeutic activities, therapeutic exercises  Plan of Care expires: 12/11/22  Plan of Care reviewed with: patient         11/29/2022

## 2022-11-29 NOTE — CONSULTS
[facility] - Telemetry ([facility])  Wound Care    Patient Name:  Chevy Skaggs   MRN:  [id number]  Date: 11/28/2022  Diagnosis: REGAN (acute kidney injury)    History:     Past Medical History:   Diagnosis Date    Cerebral palsy, unspecified     Hypertension        Social History     Socioeconomic History    Marital status:    Tobacco Use    Smoking status: Never    Smokeless tobacco: Never   Substance and Sexual Activity    Alcohol use: Never    Drug use: Never       Precautions:     Allergies as of 11/26/2022    (Not on File)       [facility] Assessment Details/Treatment     Consulted to this 71 year old male patient for wound care. PMH of cerebral palsy s/p infantile meningitis, HTN, HLD, and herpes simplex of the eye, admitted with REGAN. He is awake and alert. Sacrum with what appeared to be an evolving DTPI, now open to full thickness with moist red and maroon full thickness wound bed measuring about 3x2x0.1cm. moisture barrier cream in use. Left groin with MASD that appears fungal, miconazole powder in use. Patient with large amount of urine saturating through brief and pad. Would consider external cathether for moisture management. Recommend applying MARLON pump to bed. Wound care recs placed. Will follow.     11/28/2022

## 2022-11-29 NOTE — PLAN OF CARE
[facility] - Telemetry (Hospital)  Discharge Final Note    Primary Care Provider: Ed Giron MD    Expected Discharge Date: 11/29/2022    Final Discharge Note (most recent)       Final Note - 11/29/22 1438          Final Note    Assessment Type Final Discharge Note     Anticipated Discharge Disposition Rehab Facility        Post-Acute Status    Post-Acute Authorization Placement     Post-Acute Placement Status Set-up Complete/Auth obtained     Discharge Delays None known at this time                   Pt to discharge to [facility]. Discharge clinicals provided via fax. SW provided RN with number for report ([phone]); transport en route.     No CM needs for discharge.     Important Message from Medicare             Contact Info       Ed Giron MD   Specialty: Internal Medicine   Relationship: PCP - General    [facility]  [id number] [place] [id number]   Phone: [phone]       Next Steps: Follow up in 1 week(s)

## 2022-11-29 NOTE — DISCHARGE INSTRUCTIONS
Recommended patient to be compliant with medications, follow-up visits, maintain hydration, hygiene, participation in physical therapy and occupation therapy

## 2022-12-01 ENCOUNTER — PATIENT OUTREACH (OUTPATIENT)
Dept: ADMINISTRATIVE | Facility: CLINIC | Age: 72
End: 2022-12-01
Payer: COMMERCIAL

## 2023-02-27 PROBLEM — N17.9 AKI (ACUTE KIDNEY INJURY): Status: RESOLVED | Noted: 2022-11-26 | Resolved: 2023-02-27

## 2024-10-29 ENCOUNTER — LAB VISIT (OUTPATIENT)
Dept: LAB | Facility: HOSPITAL | Age: 74
End: 2024-10-29
Attending: INTERNAL MEDICINE
Payer: COMMERCIAL

## 2024-10-29 DIAGNOSIS — I10 ESSENTIAL HYPERTENSION, MALIGNANT: ICD-10-CM

## 2024-10-29 DIAGNOSIS — R29.898 DEFICIENCIES OF LIMBS: ICD-10-CM

## 2024-10-29 DIAGNOSIS — D64.9 ANEMIA, UNSPECIFIED: ICD-10-CM

## 2024-10-29 DIAGNOSIS — Z00.00 ROUTINE GENERAL MEDICAL EXAMINATION AT A HEALTH CARE FACILITY: ICD-10-CM

## 2024-10-29 DIAGNOSIS — E78.5 HYPERLIPEMIA: ICD-10-CM

## 2024-10-29 LAB
ALBUMIN SERPL BCP-MCNC: 4.2 G/DL (ref 3.5–5.2)
ALP SERPL-CCNC: 59 U/L (ref 40–150)
ALT SERPL W/O P-5'-P-CCNC: 31 U/L (ref 10–44)
ANION GAP SERPL CALC-SCNC: 10 MMOL/L (ref 8–16)
AST SERPL-CCNC: 28 U/L (ref 10–40)
BASOPHILS # BLD AUTO: 0.03 K/UL (ref 0–0.2)
BASOPHILS NFR BLD: 0.3 % (ref 0–1.9)
BILIRUB SERPL-MCNC: 0.8 MG/DL (ref 0.1–1)
BUN SERPL-MCNC: 17 MG/DL (ref 8–23)
CALCIUM SERPL-MCNC: 9.5 MG/DL (ref 8.7–10.5)
CHLORIDE SERPL-SCNC: 103 MMOL/L (ref 95–110)
CHOLEST SERPL-MCNC: 179 MG/DL (ref 120–199)
CHOLEST/HDLC SERPL: 6.6 {RATIO} (ref 2–5)
CO2 SERPL-SCNC: 31 MMOL/L (ref 23–29)
CREAT SERPL-MCNC: 1.2 MG/DL (ref 0.5–1.4)
DIFFERENTIAL METHOD BLD: ABNORMAL
EOSINOPHIL # BLD AUTO: 0.2 K/UL (ref 0–0.5)
EOSINOPHIL NFR BLD: 2.7 % (ref 0–8)
ERYTHROCYTE [DISTWIDTH] IN BLOOD BY AUTOMATED COUNT: 14.2 % (ref 11.5–14.5)
EST. GFR  (NO RACE VARIABLE): >60 ML/MIN/1.73 M^2
GLUCOSE SERPL-MCNC: 111 MG/DL (ref 70–110)
HCT VFR BLD AUTO: 41.2 % (ref 40–54)
HDLC SERPL-MCNC: 27 MG/DL (ref 40–75)
HDLC SERPL: 15.1 % (ref 20–50)
HGB BLD-MCNC: 13.9 G/DL (ref 14–18)
IMM GRANULOCYTES # BLD AUTO: 0.02 K/UL (ref 0–0.04)
IMM GRANULOCYTES NFR BLD AUTO: 0.2 % (ref 0–0.5)
LDLC SERPL CALC-MCNC: 96.6 MG/DL (ref 63–159)
LYMPHOCYTES # BLD AUTO: 4.2 K/UL (ref 1–4.8)
LYMPHOCYTES NFR BLD: 46.9 % (ref 18–48)
MCH RBC QN AUTO: 33.7 PG (ref 27–31)
MCHC RBC AUTO-ENTMCNC: 33.7 G/DL (ref 32–36)
MCV RBC AUTO: 100 FL (ref 82–98)
MONOCYTES # BLD AUTO: 0.8 K/UL (ref 0.3–1)
MONOCYTES NFR BLD: 8.5 % (ref 4–15)
NEUTROPHILS # BLD AUTO: 3.7 K/UL (ref 1.8–7.7)
NEUTROPHILS NFR BLD: 41.4 % (ref 38–73)
NONHDLC SERPL-MCNC: 152 MG/DL
NRBC BLD-RTO: 0 /100 WBC
PLATELET # BLD AUTO: 276 K/UL (ref 150–450)
PMV BLD AUTO: 9.6 FL (ref 9.2–12.9)
POTASSIUM SERPL-SCNC: 3.4 MMOL/L (ref 3.5–5.1)
PROT SERPL-MCNC: 8.1 G/DL (ref 6–8.4)
RBC # BLD AUTO: 4.13 M/UL (ref 4.6–6.2)
SODIUM SERPL-SCNC: 144 MMOL/L (ref 136–145)
TRIGL SERPL-MCNC: 277 MG/DL (ref 30–150)
TSH SERPL DL<=0.005 MIU/L-ACNC: 3.52 UIU/ML (ref 0.4–4)
WBC # BLD AUTO: 9.04 K/UL (ref 3.9–12.7)

## 2024-10-29 PROCEDURE — 36415 COLL VENOUS BLD VENIPUNCTURE: CPT | Mod: PO | Performed by: INTERNAL MEDICINE

## 2024-10-29 PROCEDURE — 80053 COMPREHEN METABOLIC PANEL: CPT | Mod: PO | Performed by: INTERNAL MEDICINE

## 2024-10-29 PROCEDURE — 85025 COMPLETE CBC W/AUTO DIFF WBC: CPT | Mod: PO | Performed by: INTERNAL MEDICINE

## 2024-10-29 PROCEDURE — 84443 ASSAY THYROID STIM HORMONE: CPT | Mod: PO | Performed by: INTERNAL MEDICINE

## 2024-10-29 PROCEDURE — 80061 LIPID PANEL: CPT | Performed by: INTERNAL MEDICINE
